# Patient Record
Sex: MALE | Race: WHITE
[De-identification: names, ages, dates, MRNs, and addresses within clinical notes are randomized per-mention and may not be internally consistent; named-entity substitution may affect disease eponyms.]

---

## 2019-08-29 ENCOUNTER — HOSPITAL ENCOUNTER (EMERGENCY)
Dept: HOSPITAL 92 - ERS | Age: 59
LOS: 1 days | Discharge: HOME | End: 2019-08-30
Payer: MEDICARE

## 2019-08-29 DIAGNOSIS — R10.11: Primary | ICD-10-CM

## 2019-08-29 DIAGNOSIS — I50.9: ICD-10-CM

## 2019-08-29 DIAGNOSIS — Z79.01: ICD-10-CM

## 2019-08-29 DIAGNOSIS — E11.9: ICD-10-CM

## 2019-08-29 DIAGNOSIS — Z79.899: ICD-10-CM

## 2019-08-29 DIAGNOSIS — Z79.4: ICD-10-CM

## 2019-08-29 DIAGNOSIS — I25.2: ICD-10-CM

## 2019-08-29 PROCEDURE — 96376 TX/PRO/DX INJ SAME DRUG ADON: CPT

## 2019-08-29 PROCEDURE — 36415 COLL VENOUS BLD VENIPUNCTURE: CPT

## 2019-08-29 PROCEDURE — 96374 THER/PROPH/DIAG INJ IV PUSH: CPT

## 2019-08-29 PROCEDURE — 71275 CT ANGIOGRAPHY CHEST: CPT

## 2019-08-29 PROCEDURE — 83690 ASSAY OF LIPASE: CPT

## 2019-08-29 PROCEDURE — 96375 TX/PRO/DX INJ NEW DRUG ADDON: CPT

## 2019-08-29 PROCEDURE — 81003 URINALYSIS AUTO W/O SCOPE: CPT

## 2019-08-29 PROCEDURE — 76705 ECHO EXAM OF ABDOMEN: CPT

## 2019-08-29 PROCEDURE — 85025 COMPLETE CBC W/AUTO DIFF WBC: CPT

## 2019-08-29 PROCEDURE — 80053 COMPREHEN METABOLIC PANEL: CPT

## 2019-08-29 PROCEDURE — 96361 HYDRATE IV INFUSION ADD-ON: CPT

## 2019-08-30 LAB
ALBUMIN SERPL BCG-MCNC: 4.1 G/DL (ref 3.5–5)
ALP SERPL-CCNC: 56 U/L (ref 40–150)
ALT SERPL W P-5'-P-CCNC: 15 U/L (ref 8–55)
ANION GAP SERPL CALC-SCNC: 16 MMOL/L (ref 10–20)
AST SERPL-CCNC: 12 U/L (ref 5–34)
BASOPHILS # BLD AUTO: 0 THOU/UL (ref 0–0.2)
BASOPHILS NFR BLD AUTO: 0.5 % (ref 0–1)
BILIRUB SERPL-MCNC: 0.3 MG/DL (ref 0.2–1.2)
BUN SERPL-MCNC: 11 MG/DL (ref 8.4–25.7)
CALCIUM SERPL-MCNC: 9.7 MG/DL (ref 7.8–10.44)
CHLORIDE SERPL-SCNC: 104 MMOL/L (ref 98–107)
CO2 SERPL-SCNC: 26 MMOL/L (ref 22–29)
CREAT CL PREDICTED SERPL C-G-VRATE: 0 ML/MIN (ref 70–130)
EOSINOPHIL # BLD AUTO: 0.3 THOU/UL (ref 0–0.7)
EOSINOPHIL NFR BLD AUTO: 2.9 % (ref 0–10)
GLOBULIN SER CALC-MCNC: 2.9 G/DL (ref 2.4–3.5)
GLUCOSE SERPL-MCNC: 174 MG/DL (ref 70–105)
HGB BLD-MCNC: 14.2 G/DL (ref 14–18)
LIPASE SERPL-CCNC: 44 U/L (ref 8–78)
LYMPHOCYTES # BLD: 3.3 THOU/UL (ref 1.2–3.4)
LYMPHOCYTES NFR BLD AUTO: 33.1 % (ref 21–51)
MCH RBC QN AUTO: 30.1 PG (ref 27–31)
MCV RBC AUTO: 87.3 FL (ref 78–98)
MONOCYTES # BLD AUTO: 0.7 THOU/UL (ref 0.11–0.59)
MONOCYTES NFR BLD AUTO: 6.8 % (ref 0–10)
NEUTROPHILS # BLD AUTO: 5.7 THOU/UL (ref 1.4–6.5)
NEUTROPHILS NFR BLD AUTO: 56.7 % (ref 42–75)
PLATELET # BLD AUTO: 300 THOU/UL (ref 130–400)
POTASSIUM SERPL-SCNC: 4.8 MMOL/L (ref 3.5–5.1)
PROT UR STRIP.AUTO-MCNC: 20 MG/DL
RBC # BLD AUTO: 4.72 MILL/UL (ref 4.7–6.1)
SODIUM SERPL-SCNC: 141 MMOL/L (ref 136–145)
WBC # BLD AUTO: 10.1 THOU/UL (ref 4.8–10.8)

## 2019-08-30 NOTE — CT
PRELIMINARY REPORT/[facility]/EMERGENCY AFTER

HOURS PROCEDURE: 

 

 

EXAM:

CT Angiography Chest With Contrast

 

EXAM DATE/TIME:

8/30/2019 3:17 AM

 

CLINICAL HISTORY:

59 years old, male; Chest pain; Abdominal pain; Acute; Patient HX: No previous. . . Er 13. . . M59 C/
O ruq pain that began this evening, also states it radiates to the back. Reports it is associated w/n
/v. Denies fevers, sweats, chills.

 

TECHNIQUE:

Imaging protocol: Computed tomographic angiography of the chest with intravenous contrast. 3D renderi
ng: MIP reconstructed images were created and reviewed.

 

COMPARISON:

No relevant prior studies available.

 

FINDINGS:

Tubes, catheters and devices: Left sided automated implantable cardioverter defibrillator device is i
ntact and in satisfactory position.

Pulmonary arteries: Bolus is timed for the aorta. No central embolism seen.

Aorta: There are atheromatous calcifications of the aorta without visible aneurysm. No dissection.

Lungs: No focal infiltrate. No masses.

Pleural space: No pneumothorax. No pleural effusion.

Heart: Mild cardiomegaly. There are atheromatous calcifications of the coronary vasculature.

Lymph nodes: Unremarkable. No enlarged lymph nodes.

Bones/joints: There are median sternotomy changes. Chronic degenerative spinal changes without acute 
fracture or dislocation.

Soft tissues: Unremarkable.

 

IMPRESSION:

Cardiomegaly.

Atheromatous disease. No aneurysm or dissection.

 

=========================

 

EXAM:

CT Angiography Abdomen With Contrast

 

EXAM DATE/TIME:

8/30/2019 3:17 AM

 

CLINICAL HISTORY:

59 years old, male; Chest pain; Abdominal pain; Acute; Patient HX: No previous. . . Er 13. . . M59 C/
O ruq pain that began this evening, also states it radiates to the back. Reports it is associated w/n
/v. Denies fevers, sweats, chills.

 

TECHNIQUE:

Imaging protocol: Computed tomographic angiography images of the abdomen with intravenous contrast ma
terial. 3D rendering: MIP reconstructed images were created and reviewed.

 

COMPARISON:

No relevant prior studies available.

 

FINDINGS:

VASCULATURE:

Aorta: There are atheromatous changes of the abdominal aorta without aneurysm.

Celiac trunk and mesenteric arteries: No occlusion or significant stenosis.

Renal arteries: No occlusion or significant stenosis.

ABDOMEN:

Liver: Liver is enlarged at 22.5 cm and demonstrates diffuse fatty infiltration. No solid mass is williams
ntified.

Gallbladder and bile ducts: Gallbladder distended. No calcified stones. No ductal dilation.

Pancreas: No ductal dilation.

Spleen: No splenomegaly.

Adrenals: No mass.

Kidneys and ureters: Bilateral simple renal cysts, largest on the right measuring 3.5 cm vs 2.6 cm on
 the left. Six mm nonobstructing stone, left lower pole. Three mm nonobstructing stone, left lower po
le.

Stomach and bowel: There is moderate colonic fecal retention.

Intraperitoneal space: Unremarkable. No free air. No significant fluid collection.

Bones/joints: Chronic degenerative spinal changes without acute fracture or dislocation.

Soft tissues: Unremarkable.

Lymph nodes: Unremarkable. No enlarged lymph nodes.

 

IMPRESSION:

Atheromatous disease.

No aneurysm or dissection.

Distended gallbladder.

Nonobstructing renal stones.

Fecal retention.

 

Thank you for allowing us to participate in the care of your patient.

Dictated and Authenticated by: Chantale Harkins MD

08/30/2019 3:41 AM Central Time ([place] & [place])

 

 

 

FINAL REPORT 

 

CT ANGIOGRAM OF THORACIC AORTA

CT ANGIOGRAM OF ABDOMINAL AORTA:

 

Date:  08/30/19 

 

HISTORY:  

Right upper quadrant pain, radiating to the back. Evaluate for dissection.

 

COMPARISON:  

None. 

 

TECHNIQUE:  

CT angiogram of the thoracic and abdominal aorta performed in the axial plane. Three-dimensional refo
rmatted images are submitted for interpretation. 

 

FINDINGS:

 

CHEST CT:

Cardiomegaly. No significant mediastinal mass, lymphadenopathy, or hematoma. There are coronary arter
y calcifications. 

 

Trachea and central bronchi are patent. No masses or consolidation. No pneumothorax or osseous abnorm
alities. 

 

ABDOMEN CT:

Visualized solid organs demonstrate appropriate enhancement. Hypodensities in the left and right tamy
l cortex are compatible with cysts. Bilateral nonobstructing intrarenal calculi are noted. Visualized
 alimentary canal is unremarkable. 

 

CT ANGIOGRAM:

There is appropriate enhancement and luminal diameter of the root of the thoracic aorta, ascending th
oracic aorta, aortic arch, descending thoracic aorta, and abdominal aorta. The celiac artery origin, 
superior mesenteric artery origin, renal artery origin, inferior mesenteric artery origin, aortic bif
urcation, and visualized iliac arteries have appropriate enhancement and luminal diameter. No aneurys
m or dissection. Mild atherosclerosis of the distal abdominal aorta. The visualized origin of the gre
at vessels and neck are also unremarkable. 

 

IMPRESSION: 

No evidence of aneurysm or dissection.

 

This report is in agreement with the preliminary report by Gregg. 

 

 

POS: [facility] Referring Physician (Optional): Andreas

## 2019-08-30 NOTE — ULT
PRELIMINARY REPORT/[facility]/EMERGENCY AFTER HOURS PROCEDURE:

 

Addendum created by Chantale Harkins MD on 8/30/2019 1:40 AM Central Time ([place] & [place])

Liver is enlarged at 21 cm and demonstrates diffuse fatty infiltration. No solid mass is identified.

Minimal gallbladder sludge.

Initial Report created on 8/30/2019 1:39 AM Central Time ([place] & [place])

 

EXAM:

US Abdomen Limited, Right Upper Quadrant

 

EXAM DATE/TIME:

8/30/2019 12:56 AM

 

CLINICAL HISTORY:

59 years old, male; Abdominal pain; Localized; Right upper quadrant (ruq); Patient HX: Ruq pain, bloa
ting, n/v

 

TECHNIQUE:

Imaging protocol: Real-time ultrasound of the abdomen with image documentation. Examination was focus
ed on the right upper quadrant.

 

COMPARISON:

No relevant prior studies available.

 

FINDINGS:

Liver: No masses.

Gallbladder: Distended. No gallstones. There is no gallbladder wall thickening.

Common bile duct: 5 mm. No stones. No dilation.

Pancreas: Obscured by bowel gas.

Right kidney: 3.4 x 3.1 x 3.1 cm simple cyst. No solid mass. No hydronephrosis.

 

IMPRESSION:

Distended gallbladder which otherwise appears normal. No focal tenderness.

No biliary stone or dilation. Right renal cyst.

 

Thank you for allowing us to participate in the care of your patient.

Dictated and Authenticated by: Chantale Harkins MD

08/30/2019 1:39 AM Central Time ([place] & [place])

 

 

 

FINAL REPORT 

 

GALLBLADDER ULTRASOUND:

 

FINDINGS/IMPRESSION: 

Final report is in agreement with the above-provided preliminary interpretation.

 

No acute gallbladder pathology.

 

Incidental right renal cyst.

 

Increased echogenicity of hepatic parenchyma which can be seen in the setting of hepatic steatosis.  
Correlate liver function enzymes.

## 2019-09-02 ENCOUNTER — HOSPITAL ENCOUNTER (INPATIENT)
Dept: HOSPITAL 92 - ERS | Age: 59
LOS: 5 days | Discharge: HOME | DRG: 418 | End: 2019-09-07
Attending: FAMILY MEDICINE | Admitting: FAMILY MEDICINE
Payer: MEDICARE

## 2019-09-02 VITALS — BODY MASS INDEX: 33.7 KG/M2

## 2019-09-02 DIAGNOSIS — I25.2: ICD-10-CM

## 2019-09-02 DIAGNOSIS — D72.829: ICD-10-CM

## 2019-09-02 DIAGNOSIS — K82.A1: ICD-10-CM

## 2019-09-02 DIAGNOSIS — Z95.1: ICD-10-CM

## 2019-09-02 DIAGNOSIS — I50.22: ICD-10-CM

## 2019-09-02 DIAGNOSIS — K81.0: Primary | ICD-10-CM

## 2019-09-02 DIAGNOSIS — I11.0: ICD-10-CM

## 2019-09-02 DIAGNOSIS — Z95.810: ICD-10-CM

## 2019-09-02 DIAGNOSIS — Z79.4: ICD-10-CM

## 2019-09-02 DIAGNOSIS — E83.42: ICD-10-CM

## 2019-09-02 DIAGNOSIS — E11.9: ICD-10-CM

## 2019-09-02 DIAGNOSIS — E83.39: ICD-10-CM

## 2019-09-02 DIAGNOSIS — Z79.899: ICD-10-CM

## 2019-09-02 DIAGNOSIS — Z79.02: ICD-10-CM

## 2019-09-02 DIAGNOSIS — I48.0: ICD-10-CM

## 2019-09-02 DIAGNOSIS — R77.8: ICD-10-CM

## 2019-09-02 DIAGNOSIS — I25.10: ICD-10-CM

## 2019-09-02 DIAGNOSIS — F17.210: ICD-10-CM

## 2019-09-02 DIAGNOSIS — E87.6: ICD-10-CM

## 2019-09-02 DIAGNOSIS — M06.9: ICD-10-CM

## 2019-09-02 DIAGNOSIS — D69.1: ICD-10-CM

## 2019-09-02 DIAGNOSIS — Z88.0: ICD-10-CM

## 2019-09-02 DIAGNOSIS — Z79.84: ICD-10-CM

## 2019-09-02 DIAGNOSIS — I48.91: ICD-10-CM

## 2019-09-02 DIAGNOSIS — R79.89: ICD-10-CM

## 2019-09-02 LAB
ALBUMIN SERPL BCG-MCNC: 3.7 G/DL (ref 3.5–5)
ALP SERPL-CCNC: 73 U/L (ref 40–150)
ALT SERPL W P-5'-P-CCNC: 14 U/L (ref 8–55)
ANION GAP SERPL CALC-SCNC: 15 MMOL/L (ref 10–20)
AST SERPL-CCNC: 11 U/L (ref 5–34)
BASOPHILS # BLD AUTO: 0 THOU/UL (ref 0–0.2)
BASOPHILS NFR BLD AUTO: 0.2 % (ref 0–1)
BILIRUB SERPL-MCNC: 0.4 MG/DL (ref 0.2–1.2)
BUN SERPL-MCNC: 10 MG/DL (ref 8.4–25.7)
CALCIUM SERPL-MCNC: 9.4 MG/DL (ref 7.8–10.44)
CHLORIDE SERPL-SCNC: 98 MMOL/L (ref 98–107)
CK MB SERPL-MCNC: 1.4 NG/ML (ref 0–6.6)
CO2 SERPL-SCNC: 29 MMOL/L (ref 22–29)
CREAT CL PREDICTED SERPL C-G-VRATE: 0 ML/MIN (ref 70–130)
EOSINOPHIL # BLD AUTO: 0.1 THOU/UL (ref 0–0.7)
EOSINOPHIL NFR BLD AUTO: 0.8 % (ref 0–10)
GLOBULIN SER CALC-MCNC: 3.2 G/DL (ref 2.4–3.5)
GLUCOSE SERPL-MCNC: 135 MG/DL (ref 70–105)
HGB BLD-MCNC: 12.8 G/DL (ref 14–18)
LIPASE SERPL-CCNC: 20 U/L (ref 8–78)
LYMPHOCYTES # BLD: 2 THOU/UL (ref 1.2–3.4)
LYMPHOCYTES NFR BLD AUTO: 14 % (ref 21–51)
MCH RBC QN AUTO: 29.6 PG (ref 27–31)
MCV RBC AUTO: 88.1 FL (ref 78–98)
MONOCYTES # BLD AUTO: 1.4 THOU/UL (ref 0.11–0.59)
MONOCYTES NFR BLD AUTO: 9.7 % (ref 0–10)
NEUTROPHILS # BLD AUTO: 10.5 THOU/UL (ref 1.4–6.5)
NEUTROPHILS NFR BLD AUTO: 75.3 % (ref 42–75)
PLATELET # BLD AUTO: 275 THOU/UL (ref 130–400)
POTASSIUM SERPL-SCNC: 4.5 MMOL/L (ref 3.5–5.1)
PROT UR STRIP.AUTO-MCNC: 10 MG/DL
RBC # BLD AUTO: 4.34 MILL/UL (ref 4.7–6.1)
SODIUM SERPL-SCNC: 137 MMOL/L (ref 136–145)
TROPONIN I SERPL DL<=0.01 NG/ML-MCNC: 0.07 NG/ML (ref ?–0.03)
WBC # BLD AUTO: 13.9 THOU/UL (ref 4.8–10.8)

## 2019-09-02 PROCEDURE — 80076 HEPATIC FUNCTION PANEL: CPT

## 2019-09-02 PROCEDURE — 96375 TX/PRO/DX INJ NEW DRUG ADDON: CPT

## 2019-09-02 PROCEDURE — 88304 TISSUE EXAM BY PATHOLOGIST: CPT

## 2019-09-02 PROCEDURE — 82248 BILIRUBIN DIRECT: CPT

## 2019-09-02 PROCEDURE — 86900 BLOOD TYPING SEROLOGIC ABO: CPT

## 2019-09-02 PROCEDURE — 96374 THER/PROPH/DIAG INJ IV PUSH: CPT

## 2019-09-02 PROCEDURE — 36430 TRANSFUSION BLD/BLD COMPNT: CPT

## 2019-09-02 PROCEDURE — P9035 PLATELET PHERES LEUKOREDUCED: HCPCS

## 2019-09-02 PROCEDURE — 86850 RBC ANTIBODY SCREEN: CPT

## 2019-09-02 PROCEDURE — 84484 ASSAY OF TROPONIN QUANT: CPT

## 2019-09-02 PROCEDURE — 93005 ELECTROCARDIOGRAM TRACING: CPT

## 2019-09-02 PROCEDURE — 80053 COMPREHEN METABOLIC PANEL: CPT

## 2019-09-02 PROCEDURE — 71275 CT ANGIOGRAPHY CHEST: CPT

## 2019-09-02 PROCEDURE — 83735 ASSAY OF MAGNESIUM: CPT

## 2019-09-02 PROCEDURE — 80074 ACUTE HEPATITIS PANEL: CPT

## 2019-09-02 PROCEDURE — 85027 COMPLETE CBC AUTOMATED: CPT

## 2019-09-02 PROCEDURE — 85730 THROMBOPLASTIN TIME PARTIAL: CPT

## 2019-09-02 PROCEDURE — 81003 URINALYSIS AUTO W/O SCOPE: CPT

## 2019-09-02 PROCEDURE — 86901 BLOOD TYPING SEROLOGIC RH(D): CPT

## 2019-09-02 PROCEDURE — 96361 HYDRATE IV INFUSION ADD-ON: CPT

## 2019-09-02 PROCEDURE — 85610 PROTHROMBIN TIME: CPT

## 2019-09-02 PROCEDURE — A9537 TC99M MEBROFENIN: HCPCS

## 2019-09-02 PROCEDURE — 83605 ASSAY OF LACTIC ACID: CPT

## 2019-09-02 PROCEDURE — 80048 BASIC METABOLIC PNL TOTAL CA: CPT

## 2019-09-02 PROCEDURE — 36416 COLLJ CAPILLARY BLOOD SPEC: CPT

## 2019-09-02 PROCEDURE — 83690 ASSAY OF LIPASE: CPT

## 2019-09-02 PROCEDURE — 78226 HEPATOBILIARY SYSTEM IMAGING: CPT

## 2019-09-02 PROCEDURE — 93306 TTE W/DOPPLER COMPLETE: CPT

## 2019-09-02 PROCEDURE — 36415 COLL VENOUS BLD VENIPUNCTURE: CPT

## 2019-09-02 PROCEDURE — 85007 BL SMEAR W/DIFF WBC COUNT: CPT

## 2019-09-02 PROCEDURE — 82553 CREATINE MB FRACTION: CPT

## 2019-09-02 PROCEDURE — 85025 COMPLETE CBC W/AUTO DIFF WBC: CPT

## 2019-09-02 PROCEDURE — 96376 TX/PRO/DX INJ SAME DRUG ADON: CPT

## 2019-09-02 PROCEDURE — 84100 ASSAY OF PHOSPHORUS: CPT

## 2019-09-02 PROCEDURE — 76705 ECHO EXAM OF ABDOMEN: CPT

## 2019-09-02 PROCEDURE — 96365 THER/PROPH/DIAG IV INF INIT: CPT

## 2019-09-02 NOTE — ULT
GALLBLADDER ULTRASOUND:

 

History: Right upper quadrant pain. 

 

FINDINGS: 

The liver demonstrates increased echogenicity concerning for fatty infiltration without focal mass or
 intrahepatic biliary dilatation. No gallstones are seen. There is thickening and edema in the wall o
f the gallbladder measuring about 4 mm. The common duct measures 5 mm. 

 

The pancreas is not well visualized due to overlying bowel gas. The right kidney is unremarkable. No 
free fluid is seen in Sarkar's pouch. 

 

IMPRESSION: 

Edematous gallbladder wall without gallstones. If there is concern for acute calculus, cholecystitis 
with evaluation with HIDA scan should be performed. 

 

POS: [facility]

## 2019-09-02 NOTE — PDOC.FPRHP
- History of Present Illness


Chief Complaint: Abdominal pain


History of Present Illness: 


58yo male w/ significant pmhx of CAD w/ CABG, DMII, afib, and CHF presents to 

the ED complaining of RUQ pain. Pt states that the pain started at the end of 

last week and has been progressively worsening. Pt noticed that the pain is 

worse after meals so he wasn't eating much the last couple days which improved 

his pain until today when it acutely worsened after eating greasy gas station 

food. Pt denies any assocaited sx including CP, N/V, diarrhea, and 

constipation. Initial workup in the ED showed the pt to have thickening and 

enlargement of his gallbladder w/ a WBC of 13.9 and indeterminate level 

troponin. Pt denies any chest pain or shortness of breath.





- Allergies/Adverse Reactions


 Allergies











Allergy/AdvReac Type Severity Reaction Status Date / Time


 


Penicillins Allergy Intermediate Rash Verified 10/26/16 13:02














- Home Medications


 











 Medication  Instructions  Recorded  Confirmed  Type


 


Atorvastatin Calcium [Lipitor] 40 mg PO QPM 10/26/16 09/02/19 History


 


Carvedilol 1 tab PO BID 10/26/16 09/02/19 History


 


Clopidogrel Bisulfate [Clopidogrel] 1 tab PO DAILY 10/26/16 09/02/19 History


 


Digoxin 0.5 tab PO DAILY 10/26/16 09/02/19 History


 


Insulin Glargine,Hum.Rec.Anlog 64 unit SC QPM 10/26/16 09/02/19 History





[Lantus Solostar]    


 


Liraglutide [Victoza 3-Ishmael] 1.8 mg SC DAILY 10/26/16 09/02/19 History


 


Sotalol HCl [Betapace] 40 mg PO BID 10/26/16 09/02/19 History


 


metFORMIN HCl 1 tab PO BID 10/26/16 09/02/19 History


 


Icosapent Ethyl [Vascepa] 2 cap PO BID 09/02/19 09/02/19 History


 


Ivabradine [Corlanor] 0.5 tab PO BID 09/02/19 09/02/19 History


 


Oxycodone Myristate [Xtampza ER] 1 cap PO Q12H 09/02/19 09/02/19 History


 


Sacubitril/Valsartan 49/51 1 tab PO BID 09/02/19 09/02/19 History





[Entresto 49 mg-51 mg Tablet]    


 


glipiZIDE [Glipizide] 1 tab PO BID 09/02/19 09/02/19 History














- History


PMHx: CAD s/p MI/5vCABG/Stents, Combined systolic and diastolic CHF with AICD 

in place, Spinal Stenosis, Rheumatoid Arthritis, paroxysmal a-fib, DM2


 


PSHx: 5v CABG, AICD placement





FHx: HTN, DM, CAD


 


Social: Denies tobacco, alcohol, or drugs


 


PCP: Dr. Granados - [facility]





- Review of Systems


General: denies: fever/chills, weight/appetite/sleep changes


Eyes: denies: eye pain, vision changes


ENT: denies: nasal congestion, rhinorrhea


Respiratory: denies: cough, shortness of breath


Cardiovascular: denies: chest pain, palpitation, edema


Gastrointestinal: reports: abdominal pain.  denies: nausea, vomiting, diarrhea, 

constipation


Genitourinary: denies: dysuria, polyuria


Skin: denies: rashes, lesions


Musculoskeletal: denies: pain, tenderness


Neurological: denies: numbness, weakness


Psychological: denies: anxiety, depression





- Vital signs


BP: 128/78, Pulse: 79, Resp: 16, Temp: 98.8 (Oral), Pain: 6, O2 sat: 98 on Room 

Air, Wt: 103.87kg 








- Physical Exam


Constitutional: NAD, awake, alert and oriented, well developed


HEENT: normocephalic and atraumatic, PERRLA, EOMI, conjunctiva clear, grossly 

normal vision, grossly normal hearing


Neck: supple, no LAD


Heart: RRR, normal S1/S2, no murmurs/rubs/gallops, pulses present, no edema


Lungs: CTAB, no respiratory distress, good air movement, no rales/rhonchi, no 

wheezing


Abdomen: soft, bowel sounds present, no masses/distention, other (TTP in RUQ 

with + murphys sign, no rebound or guarding)


Musculoskeletal: normal structure, normal tone


Neurological: no focal deficit, CN II-XII intact


Skin: no rash/lesions, good turgor, capillary refill <2 seconds


Heme/Lymphatic: no unusual bruising or bleeding, no purpura


Psychiatric: normal mood and affect, good judgment and insight, intact recent 

and remote memory





FMR H&P: Results





- Labs


Result Diagrams: 


 09/03/19 02:52





 09/03/19 02:52


Lab results: 


 











WBC  13.9 thou/uL (4.8-10.8)  H  09/02/19  17:14    


 


Hgb  12.8 g/dL (14.0-18.0)  L  09/02/19  17:14    


 


Hct  38.3 % (42.0-52.0)  L  09/02/19  17:14    


 


MCV  88.1 fL (78.0-98.0)   09/02/19  17:14    


 


Plt Count  275 thou/uL (130-400)   09/02/19  17:14    


 


Neutrophils %  75.3 % (42.0-75.0)  H  09/02/19  17:14    


 


Sodium  137 mmol/L (136-145)   09/02/19  17:14    


 


Potassium  4.5 mmol/L (3.5-5.1)   09/02/19  17:14    


 


Chloride  98 mmol/L ()   09/02/19  17:14    


 


Carbon Dioxide  29 mmol/L (22-29)   09/02/19  17:14    


 


BUN  10 mg/dL (8.4-25.7)   09/02/19  17:14    


 


Creatinine  0.88 mg/dL (0.7-1.3)   09/02/19  17:14    


 


Glucose  135 mg/dL ()  H  09/02/19  17:14    


 


Calcium  9.4 mg/dL (7.8-10.44)   09/02/19  17:14    


 


Total Bilirubin  0.4 mg/dL (0.2-1.2)   09/02/19  17:14    


 


AST  11 U/L (5-34)   09/02/19  17:14    


 


ALT  14 U/L (8-55)   09/02/19  17:14    


 


Alkaline Phosphatase  73 U/L ()   09/02/19  17:14    


 


CK-MB (CK-2)  1.4 ng/mL (0-6.6)   09/02/19  17:14    


 


Serum Total Protein  6.9 g/dL (6.0-8.3)   09/02/19  17:14    


 


Albumin  3.7 g/dL (3.5-5.0)   09/02/19  17:14    


 


Lipase  20 U/L (8-78)   09/02/19  17:14    


 


Urine Ketones  Negative mg/dL (Negative)   09/02/19  17:40    


 


Urine Blood  Negative  (Negative)   09/02/19  17:40    


 


Urine Nitrite  Negative  (Negative)   09/02/19  17:40    


 


Ur Leukocyte Esterase  Negative Mary/uL (Negative)   09/02/19  17:40    














- EKG Interpretation


EKG: 


NSR with PVC's, rate 76





- Radiology Interpretation


  ** Other


Status: image reviewed by me, report reviewed by me


Additional comment: 


RUQ US: Gallbladder wall thickening to 4mm, no stones seen, CBD 5mm





FMR H&P: A/P





- Problem List


(1) Acute acalculous cholecystitis


Current Visit: Yes   Status: Suspected   Code(s): K81.0 - ACUTE CHOLECYSTITIS   





(2) Coronary artery disease


Current Visit: Yes   Status: Acute   Code(s): I25.10 - ATHSCL HEART DISEASE OF 

NATIVE CORONARY ARTERY W/O ANG PCTRS   





(3) Diabetes mellitus type 2 with complications


Current Visit: Yes   Status: Acute   Code(s): E11.8 - TYPE 2 DIABETES MELLITUS 

WITH UNSPECIFIED COMPLICATIONS   





(4) Atrial fibrillation


Current Visit: Yes   Status: Acute   Code(s): I48.91 - UNSPECIFIED ATRIAL 

FIBRILLATION   





(5) Leukocytosis


Current Visit: Yes   Status: Acute   Code(s): D72.829 - ELEVATED WHITE BLOOD 

CELL COUNT, UNSPECIFIED   





- Plan


Acute Acalculous Cholecystitis - Suspected


RUQ pain, worse post prandial, FHx of cholecystitis


US: gallbladder wall thickening, WBC 13.9


- NPO


- Trend WBC


- IVF


- ED started on Zosyn, will continue


- Surgery consulted, Dr. Bryant, recommend HIDA scan





Elevated troponin w/ Hx of CAD s/p CABG


Pt with h/o 5v CABG, currently asymptomatic


- Will trend 


- Repeat EKG and trops for any chest pain


- Will call Dr. Banks in the AM for cardiac clearance


- Resume home cardiac rx





Diabetes Mellitus II


- Hold Rx while NPO


- Accuchecks q6h


- SSI





Paroxysmal A-fib


- Currently taking several anti-arrhythmics, will continue


- Admit to tele





CHF


- Daily weights


- Strict I/O's


- Continue home meds


- IVF @ 100 while pt is borderline hypotensive


   - Pt states he normally drinks about 3 gallons of water per day and takes 

his furosemide prn which is usually every few days


   - Monitor fluid status closely





Admit: Tele inpt


Diet: NPO





Code status: Full





FMR H&P: Upper Level





- Pertinent history


60 y/o M PMHx CAD s/p 5v CABG, DM2, a-fib, CHF presents to the ED complaining 

of RUQ abd pain. He reports the pain has been there since Thursday and starts 

just after eating. Nothing else makes it worse. He has not been eating as much 

the past few days and that has helped the pain. Denies any vomiting or 

diarrhea. He denies fevers, chills. Denies any prior abdominal problems or 

surgeries. Denies chest pain, SOB, diaphoresis. 





- Pertinent findings


Vitals: BP: 128/78, Pulse: 79, Resp: 16, Temp: 98.8 (Oral), Pain: 6, O2 sat: 98 

on Room Air, Wt: 103.87kg





PE: Gen - alert, oriented, NAD


HEENT - MMM


CV - RRR, no murmurs


Lungs - CTAB, no wheezes


Abd - non-distended, normoactive bowel sounds, soft, TTP in RUQ with + [place] 

sign, no rebound or guarding





RUQ US: Gallbladder wall thickening, no stones





- Plan


Date/Time: 09/02/19 2021








I, Pia Saunders MD, PGY-3, have evaluated this patient and agree with findings/

plan as outlined by intern resident. Pertinent changes/additions are listed 

here.








1. Suspected Acute Acalculous Cholecystitis


WBC count WNL. Afebrile. s/p zosyn in ED


-Dr. Bryant with general surgery has been consulted, appreciate recs


-NPO


-HIDA scan


-Will hold further fluids at this time as pt not septic, appears fluid neutral 

and do not want to overload


-Zosyn





2. Elevated troponin


Pt with h/o 5v CABG


-Will trend 


-Repeat EKG and trops for any chest pain, currently asymptomatic


-Will call Dr. Banks in the AM for cardiac clearance and prior records as pt 

reports stress test within past 3 months





3. CAD s/p CABG and stents


-Contact cards as above


-Continue medications, but hold aspirin and plavix for surgery





4. DM2


-Will hold meds while NPO


-Accuchecks q6h


-SSI





5. Paroxysmal a-fib


-continue home meds





6. CHF


-Daily weights


-Strict I/O's


-Continue home meds





Dispo: Admit to tele


LOS: likely 2 days


Code status: Full





Addendum - Attending





- Attending Attestation


Date/Time: 09/02/19 6730





I personally evaluated the patient and discussed the management with Dr. Russo/

Chip. 


I agree with the History, Examination, Assessment and Plan documented above 

with any addition or exceptions noted below.








 Patient is 60 yo M with history of CAD s/p CABG, CHF unspecified type, Afib, 

DM here for abdominal pain. Reports several days of pain, onset and worsened by 

food intake. Denies fevers, chills, nausea, vomiting, diarrhea. Denies chest 

pain, shortness of breath, or worsening pain with exertion. Exam reveals RUQ 

abdominal TTP. U/S shows possible cholecystitis without gallstone formation. 

His LFTs are currently normal. Vitals otherwise stable. He will be admitted for 

concern for acalculous cholecystitis. Gen Surg has been consulted and the 

patient will undergo HIDA scan. Continue Zosyn. He was also noted to have 

indeterminate trops without chest pain, already downtrending, and likely 2/2 

troponin leak from his CHF and known cardiomyopathy. Monitor in telemetry. 

Further recs and mgmt per HIDA result and GenSurg mgmt.

## 2019-09-03 LAB
ANION GAP SERPL CALC-SCNC: 14 MMOL/L (ref 10–20)
BASOPHILS # BLD AUTO: 0 THOU/UL (ref 0–0.2)
BASOPHILS NFR BLD AUTO: 0.2 % (ref 0–1)
BUN SERPL-MCNC: 16 MG/DL (ref 8.4–25.7)
CALCIUM SERPL-MCNC: 8.7 MG/DL (ref 7.8–10.44)
CHLORIDE SERPL-SCNC: 101 MMOL/L (ref 98–107)
CO2 SERPL-SCNC: 24 MMOL/L (ref 22–29)
CREAT CL PREDICTED SERPL C-G-VRATE: 0 ML/MIN (ref 70–130)
EOSINOPHIL # BLD AUTO: 0 THOU/UL (ref 0–0.7)
EOSINOPHIL NFR BLD AUTO: 0.1 % (ref 0–10)
GLUCOSE SERPL-MCNC: 160 MG/DL (ref 70–105)
HGB BLD-MCNC: 11.8 G/DL (ref 14–18)
LYMPHOCYTES # BLD: 1 THOU/UL (ref 1.2–3.4)
LYMPHOCYTES NFR BLD AUTO: 8.9 % (ref 21–51)
MCH RBC QN AUTO: 30 PG (ref 27–31)
MCV RBC AUTO: 88.6 FL (ref 78–98)
MONOCYTES # BLD AUTO: 1 THOU/UL (ref 0.11–0.59)
MONOCYTES NFR BLD AUTO: 9.4 % (ref 0–10)
NEUTROPHILS # BLD AUTO: 8.7 THOU/UL (ref 1.4–6.5)
NEUTROPHILS NFR BLD AUTO: 81.4 % (ref 42–75)
PLATELET # BLD AUTO: 243 THOU/UL (ref 130–400)
POTASSIUM SERPL-SCNC: 3.8 MMOL/L (ref 3.5–5.1)
RBC # BLD AUTO: 3.93 MILL/UL (ref 4.7–6.1)
SODIUM SERPL-SCNC: 135 MMOL/L (ref 136–145)
WBC # BLD AUTO: 10.7 THOU/UL (ref 4.8–10.8)

## 2019-09-03 RX ADMIN — SACUBITRIL AND VALSARTAN SCH TAB: 49; 51 TABLET, FILM COATED ORAL at 22:53

## 2019-09-03 RX ADMIN — SACUBITRIL AND VALSARTAN SCH TAB: 49; 51 TABLET, FILM COATED ORAL at 11:17

## 2019-09-03 NOTE — NM
HEPATOBILIARY SCAN:

 

Date:  09/02/19 

 

HISTORY:  

Acute cholecystitis. 

 

RADIOPHARMACEUTICAL:  5 mCi technetium-99m mebrofenin injected intravenously. 

 

CCK-8:  The patient was pretreated with 2 mg IV CCK infused 30 minutes prior to the administration of
 the radiopharmaceutical. 

 

FINDINGS:

 

There is tracer extraction by the liver without excretion into the biliary tract or small bowel loops
, or filling of the gallbladder at 4 hours post injection. 

 

Since the recent gallbladder ultrasound demonstrates no abnormal biliary ductal dilatation, this find
ing is most likely due to hepatocellular dysfunction. 

 

As there is no biliary excretion, the possibility of cholecystitis cannot be excluded on this study. 


 

Discussed over the telephone with Dr. Bryant at 1330 hours. 

CODE CR. 

 

 

 

 

POS: [facility]

## 2019-09-03 NOTE — CON
DATE OF CONSULTATION:  09/02/2019



REQUESTING PHYSICIAN:  Leonard Clark MD



HISTORY OF PRESENT ILLNESS:  This is a 59-year-old  man, who presented to

emergency department with recurrent right upper quadrant abdominal pain. 



The patient first experienced such pain 4 days previously following the dinner. 



Pain was described as sharp, bandlike radiation to his back. 



The pain was rated at 9/10 associated with episodes of nausea and nonbilious emesis. 



The patient denies any abdominal bloating or frequent flatulence. 



The patient was evaluated in the emergency department at that time and workup

included abdominal ultrasound following which the patient was discharged home. 



The next day, he felt a little better and tried to eat a bowl of soup with immediate

return of the pain. 



He has been anorexic since that time. 



Pain returned with maximum intensity today and failed to resolve.  As a result, the

patient presented to emergency department. 



He now admits to some chills, though no fevers. 



He denies any diarrhea.



PAST MEDICAL HISTORY:  Significant for coronary artery disease, of which the patient

is status post myocardial infarction 21 years ago. 



He has had no further angina since that time. 



Other pertinent medical history includes chronic congestive heart failure, type 2

diabetes mellitus, paroxysmal atrial fibrillation, and rheumatoid arthritis. 



PAST SURGICAL HISTORY:  Significant for 5-vessel coronary artery bypass graft that

was in 1999. 



He had coronary arterial stent in 2010 and repeat coronary angiography with stenting

in 2015. 



He also admits to having had left inguinal herniorrhaphy, upper and lower

endoscopies. 



SOCIAL HISTORY:  He used to smoke up to a pack of cigarettes per day prior to his

MI.  He has not smoked since that time. 



He denies any ethanol or illicit drug abuse.



FAMILY HISTORY:  Noncontributory for this patient's age.



PRE-HOSPITAL MEDICATIONS:  Includes Plavix 75 mg p.o. daily, glipizide 10 mg p.o.

b.i.d., Lantus insulin 64 units subcutaneously q.p.m., metformin 1000 mg p.o.

b.i.d., oxycodone 18 mg p.o. q.12 hours, atorvastatin 40 mg p.o. q.p.m., carvedilol

25 mg p.o. b.i.d., digoxin 0.5 mg p.o. daily, Vascepa 1 g 2 capsules p.o. b.i.d.,

Entresto 49 mg/51 mg p.o. b.i.d., and Betapace 40 mg p.o. b.i.d. 



ALLERGIES:  PENICILLIN.



REVIEW OF SYSTEMS:  Ten-point review of systems essentially unremarkable except as

stated in past medical history and chief complaint. 



PHYSICAL EXAMINATION:

GENERAL:  This reveals a 59-year-old normally developed man, who is otherwise

coherent and interactive and appears stated age.  The patient is alert and oriented

x3, appears to be in no acute distress at time of my evaluation. 

VITAL SIGNS:  Currently include blood pressure 128/78, pulse is 79, respiratory rate

is 16, temperature 98.8 degrees Fahrenheit, and oxygen saturation 98% on room air. 

HEENT:  Reveals normocephalic and atraumatic.  The pupils are equal, round, and

reactive to light and accommodation.  He has no scleral icterus present. 

HEART:  Reveals irregular rate and irregular rhythm. 

LUNGS:  Clear to auscultation bilaterally.  Breathing, regular and nonlabored. 

ABDOMEN:  Soft, nontender, nondistended. Liver and spleen nonpalpable below costal

margin. 

EXTREMITIES:  Reveal 2+ radial and pedal pulses bilaterally. 

NEUROLOGIC:  Reveals no focal deficits present.



LABORATORY FINDINGS:  Today includes a CBC with 13,900 white blood cells, hemoglobin

and hematocrit at 12.8 and 38.3 respectively.  Platelet count is 375,000. 



Metabolic profile; sodium 137, potassium 4.5, chloride is 98, bicarb is 29, BUN 10,

creatinine 0.88, glucose 135, total bilirubin is 0.4, AST and ALT are normal at 11

and 14 respectively. 



Alkaline phosphatase is also normal at 73. 



Troponin I which was trended was initially 0.158 and 3 hours later is 0.074. 



Serum lipase is normal at 20. 



I have personally reviewed the abdominal ultrasound, which was obtained today, which

revealed a slightly distended gallbladder with thickened gallbladder wall at 4 mm

when compared to abdominal ultrasound, which was obtained 4 days previously. 



No gallstones noted, however, some biliary sludge was present. 



Common bile duct remains normal on both studies at 5 mm in diameter.



IMPRESSIONS:  

1. Probable acute acalculous cholecystitis.

2. History of coronary artery disease.

3. History of diabetes mellitus.

4. Qualitative platelet dysfunction.



RECOMMENDATIONS:  

1. Obtain HIDA scan to exclude any biliary obstruction and furthermore this will

enable us to quantitate the gallbladder function. 

2. If surgical indication is established, the patient will likely be off Plavix for

at least 48-72 hours following which, laparoscopic cholecystectomy will be

undertaken. 

3. In the interim, we will ask Cardiology to evaluate the patient to weigh the risks

and benefits of proposed laparoscopic cholecystectomy. 

4. If the patient's risk far outweighs the benefit of cholecystectomy, we will give

consideration to percutaneous cholecystostomy tube placement in that setting. 

Above findings and recommendations have been discussed with the patient who

indicates understanding of information given. 



I have answered his questions. 



Should surgical intervention be pursued, the patient faces perioperative risk for

bleeding, infection, injury to bile duct or surrounding structures. 



He also faces additional risk for perioperative myocardial infarction given his

significant coronary artery disease. 



Thank you again, Dr. Clark for allowing me the opportunity to participate in the

care of this patient. 







Job ID:  [id number]

## 2019-09-03 NOTE — PDOC.FM
- Subjective


Subjective: 


pt resting comfortably in bed, reports abdominal pain and nausea improved 





- Objective


Result Diagrams: 


 09/03/19 02:52





 09/03/19 02:52





Phys Exam





- Physical Examination


Constitutional: NAD


HEENT: moist MMs


Neck: no JVD


Respiratory: clear to auscultation bilateral


Cardiovascular: no significant murmur


ttp RUQ


Musculoskeletal: pulses present


Neurological: moves all 4 limbs


Psychiatric: normal affect


Skin: no rash





Dx/Plan


(1) Atrial fibrillation


Code(s): I48.91 - UNSPECIFIED ATRIAL FIBRILLATION   Status: Acute   





(2) Coronary artery disease


Code(s): I25.10 - ATHSCL HEART DISEASE OF NATIVE CORONARY ARTERY W/O ANG PCTRS 

  Status: Acute   





(3) Diabetes mellitus type 2 with complications


Code(s): E11.8 - TYPE 2 DIABETES MELLITUS WITH UNSPECIFIED COMPLICATIONS   

Status: Acute   





(4) Acute acalculous cholecystitis


Code(s): K81.0 - ACUTE CHOLECYSTITIS   Status: Suspected   





- Plan


Plan: 





Suspected Acute Acalculous Cholecystitis


WBC count WNL. Afebrile. s/p zosyn in ED


-Dr. Bryant with general surgery has been consulted, appreciate recs


-NPO, HIDA scan pending, continue zosyn 





Elevated troponin


Pt with h/o 5v CABG


- downtrending


-Repeat EKG and trops for any chest pain, currently asymptomatic





CAD s/p CABG and stents


-Continue medications, but hold aspirin and plavix for surgery





DM2


-Will hold meds while NPO


-Accuchecks q6h


-SSI





Paroxysmal a-fib


-continue home meds





CHF


-Daily weights


-Strict I/O's


-Continue home meds





Code status: Full





Dispo: continue to eval for surgical intervention





Addendum - Attending





- Attending Attestation


Date/Time: 09/03/19 2926





I personally evaluated the patient and discussed the management with Dr. Ghosh. 


I agree with the History, Examination, Assessment and Plan documented above 

with any addition or exceptions noted below.





Patient here for suspected acalculous cholecystitis in the setting of severe 

cardiac disease. HIDA scan pending. Will likely need cardiac clearance prior to 

any surgery being performed. Awaiting further GenSurg recs.

## 2019-09-04 LAB
ALBUMIN SERPL BCG-MCNC: 2.9 G/DL (ref 3.5–5)
ALP SERPL-CCNC: 370 U/L (ref 40–150)
ALT SERPL W P-5'-P-CCNC: 188 U/L (ref 8–55)
ANION GAP SERPL CALC-SCNC: 17 MMOL/L (ref 10–20)
ANION GAP SERPL CALC-SCNC: 19 MMOL/L (ref 10–20)
AST SERPL-CCNC: 120 U/L (ref 5–34)
BASOPHILS # BLD AUTO: 0 THOU/UL (ref 0–0.2)
BASOPHILS NFR BLD AUTO: 0.3 % (ref 0–1)
BILIRUB SERPL-MCNC: 3.7 MG/DL (ref 0.2–1.2)
BUN SERPL-MCNC: 15 MG/DL (ref 8.4–25.7)
BUN SERPL-MCNC: 17 MG/DL (ref 8.4–25.7)
CALCIUM SERPL-MCNC: 8.4 MG/DL (ref 7.8–10.44)
CALCIUM SERPL-MCNC: 8.7 MG/DL (ref 7.8–10.44)
CHLORIDE SERPL-SCNC: 101 MMOL/L (ref 98–107)
CHLORIDE SERPL-SCNC: 99 MMOL/L (ref 98–107)
CO2 SERPL-SCNC: 21 MMOL/L (ref 22–29)
CO2 SERPL-SCNC: 24 MMOL/L (ref 22–29)
CREAT CL PREDICTED SERPL C-G-VRATE: 129 ML/MIN (ref 70–130)
CREAT CL PREDICTED SERPL C-G-VRATE: 130 ML/MIN (ref 70–130)
EOSINOPHIL # BLD AUTO: 0.1 THOU/UL (ref 0–0.7)
EOSINOPHIL NFR BLD AUTO: 0.8 % (ref 0–10)
GLOBULIN SER CALC-MCNC: 2.7 G/DL (ref 2.4–3.5)
GLUCOSE SERPL-MCNC: 176 MG/DL (ref 70–105)
GLUCOSE SERPL-MCNC: 217 MG/DL (ref 70–105)
HBCM INDEX: 0.04 S/CO (ref 0–0.79)
HBSAG INDEX: 0.48 S/CO (ref 0–0.99)
HEP A IGM S/CO: 0.07 S/CO (ref 0–0.79)
HEP C INDEX: 0.14 S/CO (ref 0–0.79)
HGB BLD-MCNC: 11.3 G/DL (ref 14–18)
LYMPHOCYTES # BLD: 0.9 THOU/UL (ref 1.2–3.4)
LYMPHOCYTES NFR BLD AUTO: 9.1 % (ref 21–51)
MCH RBC QN AUTO: 29.9 PG (ref 27–31)
MCV RBC AUTO: 88.9 FL (ref 78–98)
MONOCYTES # BLD AUTO: 0.7 THOU/UL (ref 0.11–0.59)
MONOCYTES NFR BLD AUTO: 7.3 % (ref 0–10)
NEUTROPHILS # BLD AUTO: 8.3 THOU/UL (ref 1.4–6.5)
NEUTROPHILS NFR BLD AUTO: 82.6 % (ref 42–75)
PLATELET # BLD AUTO: 223 THOU/UL (ref 130–400)
POTASSIUM SERPL-SCNC: 3.5 MMOL/L (ref 3.5–5.1)
POTASSIUM SERPL-SCNC: 3.7 MMOL/L (ref 3.5–5.1)
RBC # BLD AUTO: 3.78 MILL/UL (ref 4.7–6.1)
SODIUM SERPL-SCNC: 136 MMOL/L (ref 136–145)
SODIUM SERPL-SCNC: 137 MMOL/L (ref 136–145)
WBC # BLD AUTO: 10 THOU/UL (ref 4.8–10.8)

## 2019-09-04 RX ADMIN — SACUBITRIL AND VALSARTAN SCH: 49; 51 TABLET, FILM COATED ORAL at 09:18

## 2019-09-04 RX ADMIN — SACUBITRIL AND VALSARTAN SCH: 49; 51 TABLET, FILM COATED ORAL at 23:02

## 2019-09-04 NOTE — PRG
DATE OF SERVICE:  09/04/2019



SUBJECTIVE:  Mr. Mcmullen is a 59-year-old man with a significant coronary artery

disease history.  The patient came to the hospital to evaluate right upper quadrant

abdominal pain, nausea and vomiting.  The patient was consulted with General Surgery

with suspected acalculous cholecystitis.  The patient was found to have suspected

cholecystitis on ultrasound.  HIDA scan otherwise failed to visualize gallbladder,

common bile duct, or small bowel activity.  Suspect hepatic dysfunction.  The

patient also has elevated LFT.  Cardiology work also recommend the patient is low to

medium risk of cholecystectomy, laparoscopy.  Earlier today, Dr. Bryant evaluated the

patient.  He said to consult Gastroenterology for evaluation and obtain hepatitis

profile and minimize any hepatotoxic agents.  The patient was seen and evaluated on

round this evening.  The patient is lying down in bed comfortably with no acute

distress.  He is afebrile.  Vital signs are stable.  He reports having 7 to 8 time

of loose diarrhea today.  The stools to be clear yellow in nature, a little bit of

mucus at first and minimal then.  There is no blood to be reported.  His abdominal

pain subsides, somehow again better after diarrhea, then no nausea or vomiting to be

reported.  The patient will be tested for hepatitis profile and C diff due to

current antibiotic usage in the hospital. 







Job ID:  [id number]

## 2019-09-04 NOTE — PDOC.FM
- Subjective


Subjective: 





pt resting comfortably in bed, denies abdominal pain or nausea 





- Objective


Vital Signs & Weight: 


 Vital Signs (12 hours)











  Temp Pulse Resp BP Pulse Ox


 


 09/04/19 04:00  97.6 F  64  18  107/53 L  96


 


 09/03/19 23:35  98.9 F  58 L  20  93/50 L  92 L


 


 09/03/19 21:47   68   


 


 09/03/19 20:00  97.9 F  68  16  103/54 L  96








 Weight











Weight                         110.087 kg














I&O: 


 











 09/02/19 09/03/19 09/04/19





 06:59 06:59 06:59


 


Intake Total   2710


 


Output Total   400


 


Balance   2310











Result Diagrams: 


 09/04/19 04:56





 09/04/19 04:56





Phys Exam





- Physical Examination


Constitutional: NAD


HEENT: moist MMs


Neck: no JVD


Gastrointestinal: soft, non-tender, no distention


Musculoskeletal: no edema


Neurological: moves all 4 limbs


Psychiatric: normal affect


Skin: no rash





Dx/Plan


(1) Atrial fibrillation


Code(s): I48.91 - UNSPECIFIED ATRIAL FIBRILLATION   Status: Acute   





(2) Coronary artery disease


Code(s): I25.10 - ATHSCL HEART DISEASE OF NATIVE CORONARY ARTERY W/O ANG PCTRS 

  Status: Acute   





(3) Diabetes mellitus type 2 with complications


Code(s): E11.8 - TYPE 2 DIABETES MELLITUS WITH UNSPECIFIED COMPLICATIONS   

Status: Acute   





(4) Acute acalculous cholecystitis


Code(s): K81.0 - ACUTE CHOLECYSTITIS   Status: Suspected   





- Plan


Plan: 





Suspected Acute Acalculous Cholecystitis


WBC count WNL. Afebrile. s/p zosyn in ED


-Dr. Bryant with general surgery has been consulted, appreciate recs


-US showed thickened GB wall, HIDA abdnormal, continue zosyn 


- ALT/AST uptrending 





Elevated troponin, downtrended


Pt with h/o 5v CABG


-Repeat EKG and trops for any chest pain, currently asymptomatic





CAD s/p CABG and stents


-Continue medications, but hold aspirin and plavix for surgery


- consult cards, appreciate recs





DM2


-continue home meds


-Accuchecks q6h


-SSI





Paroxysmal a-fib


-continue home meds





CHF


-Daily weights


-Strict I/O's


-Continue home meds





Code status: Full





Dispo: continue to eval for surgical intervention





Addendum - Attending





- Attending Attestation


Date/Time: 09/04/19 0902





I personally evaluated the patient and discussed the management with Dr. Ghosh. 


I agree with the History, Examination, Assessment and Plan documented above 

with any addition or exceptions noted below.





Patient here for suspected acalculous cholecystitis. His HIDA scan did now show 

biliary uptake of tracer. However, his LFTs and alk phos have now trended up, 

so surgery is likely warranted. If abdominal pain worsens, check lipase. Will 

need Cardiology on board for pre-op surgical clearance. Further mgmt per Gen 

Surg and cardiology recs.

## 2019-09-04 NOTE — PRG
DATE OF SERVICE:  09/04/2019



SUBJECTIVE:  I am seeing Mr. Mcmullen today, a 59-year-old man with significant

coronary artery disease history. 



The patient underwent HIDA scan yesterday, which failed to visualize the

gallbladder, common bile duct, or small bowel activity.  Meanwhile, LFTs are now

elevated.  The patient currently denies any abdominal pain, although he had one

episode last night. 



He denies any nausea or vomiting. 



He denies any fevers or chills.



OBJECTIVE:  VITAL SIGNS:  Currently include blood pressure 107/59, pulse 56,

respiratory rate is 14, temperature 97.7 degrees Fahrenheit, and oxygen saturation

96% on room air. 

HEART:  Reveals regular rate with mild bradycardia.  No murmurs or gallops

auscultated. 

LUNGS:  Clear to auscultation bilaterally.  Breathing, regular and nonlabored. 

ABDOMEN:  Soft, nontender, and nondistended. 

NEUROLOGIC:  Reveals no focal deficits present.



LABORATORY FINDINGS:  Include a CBC with 10,000 white blood cells, hemoglobin and

hematocrit 11.3 and 33.6 respectively. 



Platelet count is 233,000. 



Metabolic profile; sodium 137, potassium 3.5, chloride is 101, bicarb is 21, BUN is

17, creatinine is 0.95, glucose is 176, total bilirubin is 3.7 with a direct

bilirubin of 2.8, AST and ALT now elevated at 120 and 188 respectively. 



Alkaline phosphatase is also elevated at 370. 



The LFT pattern suggests a biliary obstruction; however, I am concerned about the

findings in the HIDA scan from yesterday, which suggested hepatic dysfunction. 



We will ask Gastroenterology to evaluate this patient.  We will obtain hepatitis

profile and try to minimize any usage of hepatic toxic agents.  Once acute biliary

obstruction has been excluded, we will then make a consideration to the patient's

risk for either laparoscopic cholecystectomy or percutaneous cholecystostomy tube

placement. 



Above findings and plan have been discussed with the patient, who indicates

understanding information given.  I have answered his questions. 







Job ID:  [id number]

## 2019-09-04 NOTE — PRG
DATE OF SERVICE:  



Mr. Mcmullen is a 59-year-old gentleman who consulted General Surgery with suspected

acute acalculous cholecystitis with extensive history of coronary artery disease,

diabetes, and the patient reports doing better.  Pain subsided.  No nausea,

vomiting.  Vitals have been stable.  HIDA scan, resolved most slightly

hepatocellular dysfunction.  No biliary discretion.  Await for Cardiology

consultation.  If any, patient needs cholecystectomy surgery. 







Job ID:  [id number]

## 2019-09-05 LAB
ALBUMIN SERPL BCG-MCNC: 3 G/DL (ref 3.5–5)
ALP SERPL-CCNC: 379 U/L (ref 40–150)
ALT SERPL W P-5'-P-CCNC: 131 U/L (ref 8–55)
ANION GAP SERPL CALC-SCNC: 19 MMOL/L (ref 10–20)
APTT PPP: 26.7 SEC (ref 22.9–36.1)
AST SERPL-CCNC: 56 U/L (ref 5–34)
BASOPHILS # BLD AUTO: 0 THOU/UL (ref 0–0.2)
BASOPHILS NFR BLD AUTO: 0.4 % (ref 0–1)
BILIRUB SERPL-MCNC: 2.5 MG/DL (ref 0.2–1.2)
BUN SERPL-MCNC: 12 MG/DL (ref 8.4–25.7)
CALCIUM SERPL-MCNC: 8.5 MG/DL (ref 7.8–10.44)
CHLORIDE SERPL-SCNC: 102 MMOL/L (ref 98–107)
CO2 SERPL-SCNC: 21 MMOL/L (ref 22–29)
CREAT CL PREDICTED SERPL C-G-VRATE: 147 ML/MIN (ref 70–130)
EOSINOPHIL # BLD AUTO: 0.2 THOU/UL (ref 0–0.7)
EOSINOPHIL NFR BLD AUTO: 1.9 % (ref 0–10)
GLOBULIN SER CALC-MCNC: 2.7 G/DL (ref 2.4–3.5)
GLUCOSE SERPL-MCNC: 191 MG/DL (ref 70–105)
HGB BLD-MCNC: 11.7 G/DL (ref 14–18)
INR PPP: 1.1
LIPASE SERPL-CCNC: 210 U/L (ref 8–78)
LYMPHOCYTES # BLD: 1 THOU/UL (ref 1.2–3.4)
LYMPHOCYTES NFR BLD AUTO: 10.9 % (ref 21–51)
MCH RBC QN AUTO: 29.1 PG (ref 27–31)
MCV RBC AUTO: 89.2 FL (ref 78–98)
MONOCYTES # BLD AUTO: 0.6 THOU/UL (ref 0.11–0.59)
MONOCYTES NFR BLD AUTO: 7.1 % (ref 0–10)
NEUTROPHILS # BLD AUTO: 7 THOU/UL (ref 1.4–6.5)
NEUTROPHILS NFR BLD AUTO: 79.8 % (ref 42–75)
PLATELET # BLD AUTO: 255 THOU/UL (ref 130–400)
POTASSIUM SERPL-SCNC: 3.8 MMOL/L (ref 3.5–5.1)
PROTHROMBIN TIME: 14.5 SEC (ref 12–14.7)
RBC # BLD AUTO: 4.03 MILL/UL (ref 4.7–6.1)
SODIUM SERPL-SCNC: 138 MMOL/L (ref 136–145)
WBC # BLD AUTO: 8.7 THOU/UL (ref 4.8–10.8)

## 2019-09-05 PROCEDURE — 0FT44ZZ RESECTION OF GALLBLADDER, PERCUTANEOUS ENDOSCOPIC APPROACH: ICD-10-PCS | Performed by: SURGERY

## 2019-09-05 RX ADMIN — INSULIN LISPRO PRN UNIT: 100 INJECTION, SOLUTION INTRAVENOUS; SUBCUTANEOUS at 19:32

## 2019-09-05 RX ADMIN — SACUBITRIL AND VALSARTAN SCH TAB: 49; 51 TABLET, FILM COATED ORAL at 21:08

## 2019-09-05 RX ADMIN — SACUBITRIL AND VALSARTAN SCH: 49; 51 TABLET, FILM COATED ORAL at 11:24

## 2019-09-05 NOTE — ULT
ULTRASOUND HEPATIC DOPPLER:

 

Date:  09/05/19 

 

HISTORY:  

Right upper quadrant pain, nausea, vomiting, diarrhea, increase in liver function tests. 

 

COMPARISON:  

Ultrasound dated 09/02/19 and nuclear medicine study dated 09/03/19. 

 

FINDINGS:

Visualized portions of the pancreas, aorta, and IVC are unremarkable. Diffuse increased hepatic echot
exture. 

 

Portal vein is patent with antegrade flow. Normal phasicity and systolic velocity. The hepatic artery
 is patent. 

 

Right and left portal veins are patent. Liver measures 22 cm in length. Circumferential wall thickeni
ng of the gallbladder without distention. 

 

Cyst inferior pole right kidney measuring up to 3.7 cm. No hydronephrosis or right renal mass. Normal
 size of the right kidney, which measures 13.0 x 6.0 x 6.1 cm. 

 

Common bile duct normal, measuring 3.0 mm. Hepatic veins are patent. 

 

Spleen not enlarged, measuring 11.1 cm in length. Artery and vein are patent. 

 

Left kidney measures 12.0 x 5.6 x 5.4 cm with an inferior pole cyst measuring 2.6 cm. 

 

Nonobstructing calculus inferior left renal collecting system. 

 

 

IMPRESSION: 

 

1.  Diffuse increase hepatic echotexture and hepatomegaly suggesting hepatocellular dysfunction which
 may be from steatosis or hepatitis. 

 

2.  Circumferential wall thickening of a nondistended gallbladder suggesting third spacing and conges
tive changes from hepatocellular dysfunction. 

 

3.  Nonobstructing calculus inferior pole left kidney. 

 

4.  Bilateral renal cysts. 

 

5.  Adequate velocity and normal directional flow within the hepatic artery, hepatic veins, and tono
l veins. 

 

 

 

POS: CET

## 2019-09-05 NOTE — PDOC.FM
- Subjective


Subjective: 





pt resting comfortably in bed, denies abdominal pain or nausea 





- Objective


Vital Signs & Weight: 


 Vital Signs (12 hours)











  Temp Pulse Resp BP Pulse Ox


 


 09/05/19 03:05  98.1 F  65  14  123/58 L  95


 


 09/04/19 23:02   53 L   


 


 09/04/19 20:00      98


 


 09/04/19 19:41  98.0 F  53 L  16  121/58 L  98








 Weight











Admit Weight                   109.452 kg


 


Weight                         111.039 kg














I&O: 


 











 09/03/19 09/04/19 09/05/19





 06:59 06:59 06:59


 


Intake Total  2710 3478


 


Output Total  400 400


 


Balance  2310 3078











Result Diagrams: 


 09/05/19 05:42





 09/05/19 05:42





Phys Exam





- Physical Examination


Constitutional: NAD


HEENT: moist MMs


Neck: full ROM


Gastrointestinal: no distention


Musculoskeletal: no edema


Neurological: moves all 4 limbs


Psychiatric: normal affect


Skin: no rash





Dx/Plan


(1) Atrial fibrillation


Code(s): I48.91 - UNSPECIFIED ATRIAL FIBRILLATION   Status: Acute   





(2) Coronary artery disease


Code(s): I25.10 - ATHSCL HEART DISEASE OF NATIVE CORONARY ARTERY W/O ANG PCTRS 

  Status: Acute   





(3) Diabetes mellitus type 2 with complications


Code(s): E11.8 - TYPE 2 DIABETES MELLITUS WITH UNSPECIFIED COMPLICATIONS   

Status: Acute   





(4) Acute acalculous cholecystitis


Code(s): K81.0 - ACUTE CHOLECYSTITIS   Status: Suspected   





- Plan


Plan: 





Suspected Acute Acalculous Cholecystitis


WBC count WNL. Afebrile. s/p zosyn in ED


-Dr. Bryant with general surgery has been consulted, appreciate recs


-US showed thickened GB wall, HIDA abdnormal, continue zosyn 


- ALT/AST uptrending 


- GI Dr. Pittman consulted, appreciate recs 





Elevated troponin, downtrended


Pt with h/o 5v CABG


-Repeat EKG and trops for any chest pain, currently asymptomatic





CAD s/p CABG and stents


-Continue medications, but hold aspirin and plavix for surgery


- consult cards, appreciate recs





DM2


-continue home meds


-Accuchecks q6h


-SSI





Paroxysmal a-fib


-continue home meds





CHF


-Daily weights


-Strict I/O's


-Continue home meds





Code status: Full





Dispo: continue to eval for surgical intervention





Addendum - Attending





- Attending Attestation


Date/Time: 09/05/19 4468





I personally evaluated the patient and discussed the management with Dr. Ghosh. 


I agree with the History, Examination, Assessment and Plan documented above 

with any addition or exceptions noted below.





Patient overall stable. Had diarrhea yesterday so CDiff screen pending and on 

precautions. Continue Zosyn for now. Awaiting GI and Gen Surgery recs regarding 

management of his biliary dysfunction. He has been cleared by Cardiology for 

surgery. We are currently waiting on specialist input for further mgmt.

## 2019-09-05 NOTE — OP
DATE OF PROCEDURE:  09/05/2019



PREOPERATIVE DIAGNOSIS:  Acute acalculous cholecystitis.



POSTOPERATIVE DIAGNOSIS:  Acute gangrenous acalculous cholecystitis.



ANESTHESIA:  General endotracheal.



ESTIMATED BLOOD LOSS:  50 mL.



FLUIDS GIVEN:  220 mL of platelets and 280 mL of crystalloids.



COUNTS:  Sponge and instrument counts were verified as correct x2.



COMPLICATIONS:  None apparent to operation.



OPERATION PERFORMED:  Laparoscopic cholecystectomy.



INDICATIONS FOR OPERATION:  This is a 59-year-old man with history of severe

coronary artery disease and diabetes mellitus, who presented with recurrent

epigastric right upper quadrant abdominal pain. 



Clinical radiographic examination was suggestive of acute acalculous cholecystitis,

likely with choledocholithiasis for which the patient was brought to the operating

room for planned laparoscopic cholecystectomy, intraoperative cholangiogram. 



Findings are however consistent with markedly dilated gallbladder in the usual

anatomic location completely encased by dense omental adhesions. 



The cystic duct was quite small, difficult to cannulate for cholangiogram;

therefore, cholangiogram was not performed. 



DESCRIPTION OF PROCEDURE:  Informed consent was obtained from the patient and he was

brought to the operating room and placed in supine position. 



Following general anesthesia, abdomen was sterilely prepped and draped in usual

fashion. 



The skin below the umbilicus was infiltrated with 0.25% Marcaine with epinephrine. 



A small curvilinear infraumbilical incision was made using 11 scalpel. 



Umbilical stalk grasped with Kocher and elevated. 



Veress needle was inserted through the incision and placed in the peritoneal cavity

through which the abdomen was insufflated with 4 L of CO2 gas. 



Intraabdominal pressure was noted at 1 mmHg. 



Following abdominal insufflation, Veress needle was removed and a 5 mm trocar

introduced using a Visiport under laparoscopy. 



Laparoscopy confirmed proper placement of the port.  No injuries to underlying

structures. 



Additional laparoscopy reveals the right upper quadrant completely encased by

omental adhesions. 



Under direct laparoscopy, a 12 mm epigastric and two 5 mm right lateral subcostal

ports were placed after the overlying skin was infiltrated with 0.25% Marcaine with

epinephrine.  Appropriate incision was made. 



The patient was placed in a reverse Trendelenburg position, rotated to his left. 



I introduced a [place] dissector with cautery, using this to take down omental

adhesions to reveal the fundus of the gallbladder. 



The Prestige grasper was introduced through the right lateral subcostal port

grasping the fundus of the gallbladder which was elevated cephalad. 



Omental adhesions were then tediously taken down from remainder of the gallbladder.

There was a fair amount of oozing in the operative site; therefore, given the

patient's history of being on Plavix, 6 packs of platelets were given which aided

hemostasis. 



The cystic duct was then carefully dissected free from surrounding structures,

identifying the triangle of Calot. 



The cystic artery was also dissected free from surrounding structures and the artery

was divided between clips, applying 2 clips proximally and one clip at the junction

of the cystic artery and gallbladder. 



The cystic duct was very small and difficult to cannulate for cholangiogram;

therefore, we elected to abort cholangiography. 



The cystic duct was then divided between clips applying 2 clips proximally and one

clip at the junction of the cystic duct and gallbladder. 



The gallbladder itself was removed from the liver bed using cautery. 



The back wall of the gallbladder was quite necrotic. 



The gallbladder was delivered of the abdominal cavity using an EndoCatch. 



Operative site was irrigated with saline. 



Minor oozing in the gallbladder fossa was readily controlled using Mary. 



Finding no other pathology, laparoscopy was terminated. 



Fascia of the epigastric port was closed using 0 Vicryl suture and Endoclosure

device on the laparoscopy. 



The fascia was again closed anteriorly using interrupted suture of 2-0 Vicryl using

a UR6 needle. 



Prior to fascia closure, #19 Landon drain was introduced into the right upper

quadrant and allowed to exit the abdominal cavity through the right lateral

subcostal port. 



The drain was secured to anterior abdominal wall using 2-0 silk suture. 



Once the abdomen was desufflated, remainder of the incisions were closed using

interrupted sutures of 4-0 Monocryl suture in subcuticular fashion. 



Dermabond was applied over incisional closure. 



Drain gauze was applied over the drain site. 



The patient tolerated the operation without any apparent complication and was

returned to the recovery room in satisfactory condition. 







Job ID:  [id number]

## 2019-09-05 NOTE — CON
DATE OF CONSULTATION:  



REASON FOR CONSULT:  Elevated liver enzymes.



HISTORY OF PRESENT ILLNESS:  Mr. Mcmullen is a 59-year-old gentleman who has been

seen by our service in the past for screening colonoscopy in 2016, and had a few

polyps removed.  Since then, he has not been seen. More recently, he developed

abdominal pain and came to the hospital on 08/29.  He reports that he has been

having right upper quadrant epigastric pain that radiates to his back

intermittently, worse with eating for several days with the pain got severe, he came

to the hospital.  Here, he had stable vital signs and no fever on presentation.  He

had a CT dissection protocol that showed a distended gallbladder.  He also was shown

to have mild cardiomegaly and enlarged liver with diffuse fatty infiltration.  No

common bile duct dilatation.  After this, he had an ultrasound that again showed a

distended gallbladder.  No gallbladder wall thickening, 5 mm common bile duct.  No

gallstones.  He returned to the emergency room on 09/02. At that time, [facility] admitted him for the same symptoms. When he returned, he had a white count

of 13.9, indeterminate troponin, normal liver enzymes.  He has been watched by

General Surgery.  He had a HIDA scan that showed nonfilling of the biliary tree or

gallbladder.  The delayed films showed the same.  The radiologist felt this is

possibly from hepatic dysfunction.  This study was performed on 09/02. His blood

pressure had been documented low a few times here on the floor.  It is unclear to me

what it was in the emergency room but on 09/03, it was 93/50, and 107/59 today.

Apparently, he was started on some fluids yesterday he states.  His pain is still

present, not as bad as it was.  He is not really eating very much. 



PAST MEDICAL HISTORY:  Cardiomyopathy with previous defibrillator placement.  He

reports he sees Dr. Banks and then he has had EF around 30% to 40%.  Coronary

artery disease with previous stents, diabetes, atrial fibrillation, history of

spinal stenosis, rheumatoid arthritis, paroxysmal atrial fibrillation. 



PAST SURGICAL HISTORY:  Five-vessel CABG, AICD placement, colonoscopy with

polypectomy. 



FAMILY HISTORY:  Hypertension, diabetes, and coronary artery disease.



SOCIAL HISTORY:  Denies alcohol, drugs, or tobacco.



PRIMARY CARE:  [facility] to [facility].



REVIEW OF SYSTEMS:  Negative for fever, chills, dysuria, frequency, urgency, prior

history of icterus, hepatitis, no jaundice, sick contacts, contacts with anybody

with acute hepatitis, nausea, vomiting, diarrhea.  He denies chest pain or shortness

of breath now. 



MEDICATIONS:  

1. Vascepa.

2. Atorvastatin.

3. Coreg.

4. D5W.

5. Lanoxin.

6. Humalog.

7. Ivabradine.

8. LR at 100.

9. Morphine p.r.n.

10. Zofran p.r.n.

11. Zosyn.

12. Entresto.

13. Sotalol. 

Home medications;

1. Metformin.

2. Insulin.

3. Victoza.

4. Carvedilol.

5. Plavix.

6. Lipitor.

7. Digoxin.

8. Sotalol.

9. Oxycodone.

10. Glipizide.

11. Vascepa. 

12. Corlanor.

13. Entresto.



PHYSICAL EXAMINATION:

VITAL SIGNS:  Temperature 98, pulse 58, blood pressure 93/50. 

GENERAL: He is resting comfortably in bed. 

HEENT:  He is nonicteric.  Oropharynx, no lesions. 

NECK:  Supple.  No adenopathy. 

LUNGS:  Clear. 

HEART: Regular rate and rhythm without clicks or murmurs. 

ABDOMEN:  Soft.  There is mild tenderness in right upper quadrant.  There is no

rebound.  There is no guarding. 

EXTREMITIES:  There is no peripheral edema.  There is no JVD noted.



LABORATORY STUDIES:  Today, white count 10, hemoglobin 11, platelet count 223, MCV

is 88.  Sodium 137, potassium 3.5, bicarb 21, BUN 17, creatinine 0.9, glucose 176.

Bilirubin is 3.7, AST and ALT are 120 and 188 with alkaline phosphatase of 370,

bilirubin direct is 2.8, albumin is 2.9, protein is 5.6. On the 2nd, he had normal

albumin, normal LFTs. 



ASSESSMENT:  This is a 59-year-old with significant heart disease and heart failure

who is admitted with right upper quadrant pain, fairly consistent with biliary

colic, who has multiple imaging studies that showed a dilated gallbladder, normal

common bile duct, normal LFTs and lipase on admission.  He has was sent home and now

he is back.  He has a HIDA scan that does not show any filling of the gallbladder or

biliary system.  The radiologist felt this was from "intrinsic liver disease."  This

would be consistent with acute cholecystitis.  He could have some hepatic

dysfunction related to heart failure, __________ passive congestion or ischemic

hepatopathy as he has been hypotensive here.  He shows no overt signs of sepsis. 



RECOMMENDATIONS:  

1. I would get a cardiology consult today.  I would get an echocardiogram today to

 his cardiac risks  as he is going to need a surgery, an ERCP or a

cholecystostomy tube most likely. 

2. He has had hepatitis panels which have been negative.  I would repeat his LFTs in

the morning.  I will repeat his liver ultrasound with Dopplers to evaluate his

hepatic flow and ask for assessment of the IVC for signs of passive congestion of

the liver. 

I will also recheck the common bile duct for any signs of obstruction.  At this

point in time, the patient shows no evidence of cholangitis.  It is unclear if this

is an increased LFT secondary to obstructive process or hepatocellular process.  We

will follow along with you. 







Job ID:  [id number]

## 2019-09-05 NOTE — PRG
DATE OF SERVICE:  09/05/2019



SUBJECTIVE:  Mr. Mcmullen has had some recurrence of right upper quadrant pain and

nausea.  No vomiting.  Today, he is afebrile.  Plan is for laparoscopic

cholecystectomy with intraoperative cholangiogram later today.  LFTs declined

marginally with total bilirubin down to 2.5. 



PHYSICAL EXAMINATION:

VITAL SIGNS:  Temperature 97.9, pulse 88, blood pressure 132/71, and 95% oxygen

saturation on room air. 

GENERAL:  In no acute distress. 

HEART:  Regular rate and rhythm. 

LUNGS:  Clear to auscultation bilaterally. 

ABDOMEN:  Bowel sounds present.  Soft.  Some tenderness to palpation in the upper

abdomen. 

EXTREMITIES:  No peripheral edema.



LABORATORY STUDIES:  WBC 8.7, hemoglobin 11.7, platelets 255.  INR is 1.1.  Sodium

138, potassium 3.8, BUN 12, creatinine 0.85.  Total bilirubin down to 2.5, alkaline

phosphatase 379, AST down to 56, ALT down to 131.  Lipase is 210.  Note that on

admission, all LFTs and lipase were normal.  Viral hepatitis serologies all

negative.  Urinalysis, negative.  Abdominal ultrasound from earlier today

demonstrates a common bile duct still normal in caliber at only 3 mm.  There is

heterogeneity of the liver suggesting hepatic steatosis or hepatitis.  There is

circumferential wall thickening of nondistended gallbladder. 



ASSESSMENT AND PLAN:  

1. Probable acute acalculous cholecystitis.

2. LFT elevation, acute, but downtrending.  Note, there is no demonstration of

gallstones or common bile duct dilation on repeated abdominal ultrasound imaging.

The fact that the LFTs were normal on admission and that he has no known history of

prior liver dysfunction, argues against significant hepatic dysfunction.  If this

were related to drug toxicity, I would not expect the LFTs to improve today.  I

agree with the plan for laparoscopic cholecystectomy.  Intraoperative cholangiogram

is 

planned.  I discussed with the patient and Dr. Bryant that if the cholangiogram is

positive for biliary obstruction, we could proceed with ERCP under the same

anesthesia.  I discussed this in detail with the patient and he desires to proceed.

GI will continue to follow along. 







Job ID:  [id number]

## 2019-09-06 LAB
ALBUMIN SERPL BCG-MCNC: 2.8 G/DL (ref 3.5–5)
ALP SERPL-CCNC: 356 U/L (ref 40–150)
ALT SERPL W P-5'-P-CCNC: 97 U/L (ref 8–55)
ANION GAP SERPL CALC-SCNC: 14 MMOL/L (ref 10–20)
AST SERPL-CCNC: 51 U/L (ref 5–34)
BILIRUB DIRECT SERPL-MCNC: 1.6 MG/DL (ref 0.1–0.3)
BILIRUB SERPL-MCNC: 2 MG/DL (ref 0.2–1.2)
BUN SERPL-MCNC: 9 MG/DL (ref 8.4–25.7)
CALCIUM SERPL-MCNC: 8.2 MG/DL (ref 7.8–10.44)
CHLORIDE SERPL-SCNC: 100 MMOL/L (ref 98–107)
CO2 SERPL-SCNC: 26 MMOL/L (ref 22–29)
CREAT CL PREDICTED SERPL C-G-VRATE: 127 ML/MIN (ref 70–130)
GLUCOSE SERPL-MCNC: 254 MG/DL (ref 70–105)
HGB BLD-MCNC: 11.1 G/DL (ref 14–18)
MAGNESIUM SERPL-MCNC: 1.7 MG/DL (ref 1.6–2.6)
MCH RBC QN AUTO: 29.5 PG (ref 27–31)
MCV RBC AUTO: 87.5 FL (ref 78–98)
MDIFF COMPLETE?: YES
PLATELET # BLD AUTO: 270 THOU/UL (ref 130–400)
POTASSIUM SERPL-SCNC: 3.7 MMOL/L (ref 3.5–5.1)
RBC # BLD AUTO: 3.77 MILL/UL (ref 4.7–6.1)
SODIUM SERPL-SCNC: 136 MMOL/L (ref 136–145)
WBC # BLD AUTO: 9.9 THOU/UL (ref 4.8–10.8)

## 2019-09-06 RX ADMIN — SACUBITRIL AND VALSARTAN SCH TAB: 49; 51 TABLET, FILM COATED ORAL at 09:27

## 2019-09-06 RX ADMIN — Medication SCH ML: at 20:47

## 2019-09-06 RX ADMIN — INSULIN LISPRO PRN UNIT: 100 INJECTION, SOLUTION INTRAVENOUS; SUBCUTANEOUS at 19:10

## 2019-09-06 RX ADMIN — INSULIN LISPRO PRN UNIT: 100 INJECTION, SOLUTION INTRAVENOUS; SUBCUTANEOUS at 00:23

## 2019-09-06 RX ADMIN — Medication SCH ML: at 09:27

## 2019-09-06 RX ADMIN — SACUBITRIL AND VALSARTAN SCH TAB: 49; 51 TABLET, FILM COATED ORAL at 20:47

## 2019-09-06 NOTE — PRG
DATE OF SERVICE:  09/05/2019



SUBJECTIVE:  Mr. Mcmullen is a 59-year-old male who comes in to evaluation of right

upper quadrant abdominal pain.  He was found to have cholecystitis and elevated LFT.

 He has history of substantial coronary artery disease and he also had 7 episode of

loose stool and diarrhea yesterday in which he was put under C. diff precaution.

The patient underwent laparoscopic cholecystectomy today with Dr. Bryant.  During the

procedure, the patient was found to have acute gangrenous acalculous cholecystitis.

Postop, the patient has been doing good.  He developed no fever or shortness of

breath.  Vital signs have been stable.  He developed no chest pain.  He had not had

any episode of diarrhea since last night.  His vital signs have been stable.  He has

drainage from cholecystectomy incision is around 30 mL with light red in color.  The

patient was seen and evaluated on round this evening, the patient lying down in bed

comfortably with no acute distress. 



PHYSICAL EXAMINATION:

LUNGS:  Clear bilaterally. 

HEART:  Regular rate and rhythm. 

ABDOMEN:  Soft, asymmetric.  Tender to touch of the incision sites.  No peritoneal

signs developed. 

EXTREMITY:  Neurovascularly intact. 

NEUROLOGIC:  No neuro focal deficits.



PLAN:  Would be to continue supportive care.  Continue gastritis prophylaxis.

Continue pain control.  The patient is on a clear-liquid diet.  Anticipate he can be

full-liquid diet or regular diet as tolerated tomorrow.  Await for C. difficile

assay results. 







Job ID:  [id number]

## 2019-09-06 NOTE — PDOC.CPN
- Subjective


Date: 09/06/19


Time: 09:43


Interval history: 





The pt seen and examined.  No overnight events.  No cardiac complaints.





- Objective


Allergies/Adverse Reactions: 


 Allergies











Allergy/AdvReac Type Severity Reaction Status Date / Time


 


Penicillins Allergy Intermediate Rash Verified 09/03/19 18:12











Visit Medications: 


 Current Medications





Carvedilol (Coreg)  25 mg PO BID [facility]


   Last Admin: 09/06/19 09:26 Dose:  25 mg


Dextrose/Water (Dextrose 50%)  25 gm SLOW IVP PRN PRN


   PRN Reason: Hypoglycemia


Digoxin (Lanoxin)  0.125 mg PO DAILY [facility]


   Last Admin: 09/06/19 09:25 Dose:  0.125 mg


Glucagon (Glucagon)  1 mg IM PRN PRN


   PRN Reason: Hypoglycemia


Dextrose/Water (D5w)  1,000 mls @ 0 mls/hr IV .Q0M PRN


   PRN Reason: Hypoglycemia


Lactated Ringer's (Lactated Ringer's)  1,000 mls @ 100 mls/hr IV .Q10H [facility]


   Last Admin: 09/06/19 01:36 Dose:  1,000 mls


Magnesium Sulfate 3 gm/ Sodium (Chloride)  106 mls @ 53 mls/hr IVPB NOW [facility]


   Stop: 09/06/19 12:00


Insulin Human Lispro (Humalog)  0 units SC .MODERATE SLIDING SC PRN


   PRN Reason: Moderate Correctional Scale


   Last Admin: 09/06/19 00:23 Dose:  6 unit


Ivabradine (Corlanor)  2.5 mg PO BID [facility]


   Last Admin: 09/06/19 09:26 Dose:  2.5 mg


Morphine Sulfate (Morphine)  4 mg SLOW IVP Q4H PRN


   PRN Reason: Severe Pain (7-10)


   Last Admin: 09/06/19 09:27 Dose:  4 mg


(Icosapent Ethyl [ (Vascepa] 2 Cap))  2 cap PO BID [facility]


Ondansetron HCl (Zofran Odt)  4 mg PO Q6H PRN


   PRN Reason: Nausea/Vomiting


   Last Admin: 09/04/19 17:31 Dose:  4 mg


Sacubitril/Valsartan (Entresto 49 Mg-51 Mg Tablet)  1 tab PO BID [facility]


   Last Admin: 09/06/19 09:27 Dose:  1 tab


Sodium Chloride (Flush - Normal Saline)  10 ml IVF Q12HR [facility]


   Last Admin: 09/06/19 09:27 Dose:  10 ml


Sodium Chloride (Flush - Normal Saline)  10 ml IVF PRN PRN


   PRN Reason: Saline Flush


Sotalol HCl (Betapace)  40 mg PO BID [facility]


   Last Admin: 09/06/19 09:25 Dose:  40 mg








Vital Signs & Weight: 


 Vital Signs











  Temp Pulse Resp BP Pulse Ox


 


 09/06/19 09:25   64   


 


 09/06/19 08:40  98.4 F  64  16  125/67  95


 


 09/06/19 04:50  99.4 F  68  14  123/57 L  97


 


 09/06/19 00:00     123/60 








 











Admit Weight                   241 lb 4.8 oz


 


Weight                         248 lb 8 oz

















- Physical Exam


General: alert & oriented x3


HEENT: mucus membranes moist


Neck: supple neck


Cardiac: regular rate and rhythm, S1/S2


Lungs: clear to auscultation


Skin: clear


Musculoskeletal: normal range of motion





- Labs


Result Diagrams: 


 09/06/19 06:25





 09/06/19 06:25


 Troponin/CKMB











CK-MB (CK-2)  1.4 ng/mL (0-6.6)   09/02/19  17:14    


 


Troponin I  0.074 ng/mL (< 0.028)  H  09/02/19  20:35    














- Telemetry


Sinus rhythms and dysrhythmias: sinus rhythm (SR with V paced)





- Assessment/Plan


Assessment/Plan: 





1. Prox Afib - remains in SR with V paced; On Coreg, Digoxin, Sotalol; Will 

resume ASA 81mg qd once cleared by Surgery team


2. Chronic systolic HF with AICD placement - stable; on Coreg, Corlanor; 


3. s/p Ex lap cholecystectomy on 09/05/2019 


4. CAD wtih hx of stent - stable; on Coreg; will resume Statin, Plavix, and ASA 

81mg qd once cleared by Surgery team


5. DM type 2 - managed by PCP


MAR reviewed














Pt. seen and eval. by me. He feels better today. still some abd. discomfort. No 

arhythmias. Cardiac status is stable. I agree with the A/P by the NP. I will 

sign off. If any cardiac issues then please contact us again.

## 2019-09-06 NOTE — PRG
DATE OF SERVICE:  09/06/2019



SUBJECTIVE:  Mr. Mcmullen is a 59-year-old man, postop day #1, status post

laparoscopic cholecystectomy for acute gangrenous acalculous cholecystitis. 



The patient is awake and alert today, reports adequate pain control. 



He tolerates clear liquid diet.



OBJECTIVE:  VITAL SIGNS:  Today include blood pressure 125/67, pulse 64, 
respiratory

rate 16, temperature is 98.4 degrees Fahrenheit, oxygen saturation 95% on 2 L by

nasal cannula oxygen. 

HEART:  Reveals regular rate and rhythm. 

LUNGS:  Clear to auscultation bilaterally.  Breathing, regular and nonlabored. 

ABDOMEN:  Soft with incisional tenderness to palpation.  He has no gross 
peritoneal

signs on examination.  Kody-Matamoros drain returns 60 mL of serous fluid. 

NEUROLOGIC:  Reveals no focal deficits present.



LABORATORY FINDINGS:  Include a CBC with 9900 white blood cells, hemoglobin and

hematocrit are stable at 11.1 and 33.0 respectively. 



Platelet count is 270,000. 



Differential count as follows; 77% segmented neutrophils, 3 bands, 19 
lymphocytes,

and 1 monocyte. 



Metabolic profile; sodium 136, potassium 3.7, chloride is 100, bicarb is 26, 
BUN is

9, creatinine 0.98, glucose 254, magnesium is 1.7, phosphorus is 3.1, total

bilirubin is slightly decreased at 2.0, AST and ALT are resolving at 51 and 97

respectively. 



IMPRESSION:  

1. Postoperative day #1, status post laparoscopic cholecystectomy.

2. Acute gangrenous acalculous cholecystitis.

3. Acute hypokalemia.

4. Acute hypomagnesemia.

5. Acute hypophosphatemia.



PLAN:  

1. Correct abnormal electrolytes.

2. Advance diet as tolerated.

3. Increase activity per Physical and Occupational Therapy. 

Discussed with Gastroenterology, we will continue to monitor the patient as 
there

seems to be no acute indication today for ERCP. 



Above findings and plan discussed with the patient and family at bedside. 



They indicated understanding of information given.   



I have answered his questions.







Job ID:  [id number]



[facility]

## 2019-09-06 NOTE — PDOC.FM
- Subjective


Subjective: 





pt resting comfortably in bed, denies sob, reports pain





- Objective


Vital Signs & Weight: 


 Vital Signs (12 hours)











  Temp Pulse Resp BP BP Pulse Ox


 


 09/06/19 04:50  99.4 F  68  14   123/57 L  97


 


 09/06/19 00:00      123/60 


 


 09/05/19 21:06   84   180/79 H  


 


 09/05/19 20:00  97.5 F L  84  18   180/79 H  95








 Weight











Admit Weight                   109.452 kg


 


Weight                         111.039 kg














I&O: 


 











 09/04/19 09/05/19 09/06/19





 06:59 06:59 06:59


 


Intake Total 2710 3478 1360


 


Output Total 400 400 680


 


Balance 2310 3078 680











Result Diagrams: 


 09/06/19 06:25





 09/06/19 06:25





Phys Exam





- Physical Examination


Constitutional: NAD


HEENT: moist MMs


Neck: supple


Gastrointestinal: soft


ttp


Musculoskeletal: no edema


Neurological: moves all 4 limbs


Psychiatric: normal affect


Skin: no rash





Dx/Plan


(1) Atrial fibrillation


Code(s): I48.91 - UNSPECIFIED ATRIAL FIBRILLATION   Status: Acute   





(2) Coronary artery disease


Code(s): I25.10 - ATHSCL HEART DISEASE OF NATIVE CORONARY ARTERY W/O ANG PCTRS 

  Status: Acute   





(3) Diabetes mellitus type 2 with complications


Code(s): E11.8 - TYPE 2 DIABETES MELLITUS WITH UNSPECIFIED COMPLICATIONS   

Status: Acute   





(4) Acute acalculous cholecystitis


Code(s): K81.0 - ACUTE CHOLECYSTITIS   Status: Suspected   





- Plan


Plan: 





Suspected Acute Acalculous Cholecystitis


WBC count WNL. Afebrile. ANA MARIA castellanos 


-Dr. Bryant with general surgery has been consulted, appreciate recs


-US showed thickened GB wall, HIDA abdnormal 


- ALT/AST remain elevated, consider ERCP today


- GI Dr. Pittman consulted, appreciate recs 





Elevated troponin, downtrended


Pt with h/o 5v CABG


-Repeat EKG and trops for any chest pain, currently asymptomatic





CAD s/p CABG and stents


-Continue medications, resume aspirin and plavix per surgery


- consult cards, appreciate recs





DM2


-continue home meds


-Accuchecks q6h


-SSI





Paroxysmal a-fib


-continue home meds





CHF


-Daily weights


-Strict I/O's


-Continue home meds





Code status: Full





Dispo: s/p lap sun, further eval today for CBD stone, DC per surgery





Addendum - Attending





- Attending Attestation


Date/Time: 09/06/19 0458





I personally evaluated the patient and discussed the management with Dr. Ghosh. 


I agree with the History, Examination, Assessment and Plan documented above 

with any addition or exceptions noted below.





Patient overall doing well, has some mild pain this morning. Underwent Lap 

sun yesterday. He continues to be NPO at this time and awaiting repeat labs 

this morning. Possible MRCP/ERCP if labs are still consistent with biliary 

dysfunction. Awaiting those results and further GI/Gen Surg recs. Cardiac 

function stable, wean O2 as tolerated.

## 2019-09-07 VITALS — DIASTOLIC BLOOD PRESSURE: 63 MMHG | TEMPERATURE: 98.1 F | SYSTOLIC BLOOD PRESSURE: 121 MMHG

## 2019-09-07 LAB
ALBUMIN SERPL BCG-MCNC: 2.9 G/DL (ref 3.5–5)
ALP SERPL-CCNC: 332 U/L (ref 40–150)
ALT SERPL W P-5'-P-CCNC: 74 U/L (ref 8–55)
ANION GAP SERPL CALC-SCNC: 14 MMOL/L (ref 10–20)
AST SERPL-CCNC: 29 U/L (ref 5–34)
BILIRUB DIRECT SERPL-MCNC: 1.1 MG/DL (ref 0.1–0.3)
BILIRUB SERPL-MCNC: 1.4 MG/DL (ref 0.2–1.2)
BUN SERPL-MCNC: 7 MG/DL (ref 8.4–25.7)
CALCIUM SERPL-MCNC: 8.2 MG/DL (ref 7.8–10.44)
CHLORIDE SERPL-SCNC: 97 MMOL/L (ref 98–107)
CO2 SERPL-SCNC: 27 MMOL/L (ref 22–29)
CREAT CL PREDICTED SERPL C-G-VRATE: 149 ML/MIN (ref 70–130)
GLUCOSE SERPL-MCNC: 265 MG/DL (ref 70–105)
MAGNESIUM SERPL-MCNC: 2.2 MG/DL (ref 1.6–2.6)
POTASSIUM SERPL-SCNC: 3.5 MMOL/L (ref 3.5–5.1)
SODIUM SERPL-SCNC: 134 MMOL/L (ref 136–145)

## 2019-09-07 RX ADMIN — SACUBITRIL AND VALSARTAN SCH TAB: 49; 51 TABLET, FILM COATED ORAL at 08:26

## 2019-09-07 RX ADMIN — Medication SCH ML: at 08:26

## 2019-09-07 RX ADMIN — INSULIN LISPRO PRN UNIT: 100 INJECTION, SOLUTION INTRAVENOUS; SUBCUTANEOUS at 13:13

## 2019-09-07 NOTE — PRG
DATE OF SERVICE:  09/07/2019



SUBJECTIVE:  Mr. Mcmullen is a 59-year-old male who comes for evaluation of right

upper quadrant abdominal pain.  He was found to have gangrenous cholecystitis and

elevated LFTs.  He has history of coronary artery disease.  He underwent

cholecystectomy with a diagnosis of gangrene acalculous cholecystitis.  Postop, the

patient reports doing better.  Pain is well controlled.  He reports his diarrhea had

stopped.  He has no nausea, vomiting, even though he reports has some burping.  He

does not have any gas or bowel yet.  His vital signs have been stable. 



OBJECTIVE:  GENERAL:  The patient is lying down in bed comfortably with no acute

distress. 

LUNGS:  Clear bilaterally. 

HEART:  Regular rate and rhythm. 

ABDOMEN:  Soft, tender to touch of the incision site.  No peritoneal signs

developed. 

EXTREMITIES:  Neurovascularly intact. 

NEUROLOGIC:  No focal neurology deficits.



PLAN:  Will be to continue supportive care.  Continue gastritis prophylaxis.

Continue bowel rest.  Diet as tolerated.  Increase activity per Physical and

Occupational Therapy. 







Job ID:  [id number]

## 2019-09-07 NOTE — PDOC.FM
- Subjective


Subjective: 





Patient complains of some RUQ overnight, otherwise states he slept okay. Denies 

chest pain, palpitations, SOB. Telemetry overnight showed BBB in 60s bpm, 9 

beats of Vtach, and 20 sec of AIVR. During these episodes nursing staff reports 

that patient was asymptomatic. 





- Objective


Vital Signs & Weight: 


 Vital Signs (12 hours)











  Temp Pulse Resp BP BP BP Pulse Ox


 


 09/07/19 03:15  98.1 F  57 L  20    132/62  98


 


 09/06/19 23:20  97.9 F  65  14   131/65  


 


 09/06/19 20:48   67   129/66   


 


 09/06/19 19:35  98.4 F  67  16   129/66   95








 Weight











Admit Weight                   109.452 kg


 


Weight                         114.169 kg














I&O: 


 











 09/05/19 09/06/19 09/07/19





 06:59 06:59 06:59


 


Intake Total 3478 2910 1120


 


Output Total 400 1370 490


 


Balance 3078 1540 630











Result Diagrams: 


 09/06/19 06:25





 09/07/19 04:27





Phys Exam





- Physical Examination


Constitutional: NAD


HEENT: moist MMs, sclera anicteric


Neck: no JVD, supple, full ROM


Respiratory: no wheezing, no rhonchi, clear to auscultation bilateral


Cardiovascular: RRR, no significant murmur


Gastrointestinal: soft, no distention, positive bowel sounds


TTP over RUQ, with minimal guarding


Musculoskeletal: no edema, pulses present


Neurological: moves all 4 limbs


Psychiatric: normal affect, A&O x 3


Skin: no rash, normal turgor





Dx/Plan


(1) Atrial fibrillation


Code(s): I48.91 - UNSPECIFIED ATRIAL FIBRILLATION   Status: Acute   


Qualifiers: 


   Atrial fibrillation type: unspecified   Qualified Code(s): I48.91 - 

Unspecified atrial fibrillation   





(2) Coronary artery disease


Code(s): I25.10 - ATHSCL HEART DISEASE OF NATIVE CORONARY ARTERY W/O ANG PCTRS 

  Status: Acute   


Qualifiers: 


   Coronary Disease-Associated Artery/Lesion type: unspecified vessel or lesion 

type   Native vs. transplanted heart: native heart   Associated angina: with 

unspecified angina   Qualified Code(s): I25.119 - Atherosclerotic heart disease 

of native coronary artery with unspecified angina pectoris   





(3) Diabetes mellitus type 2 with complications


Code(s): E11.8 - TYPE 2 DIABETES MELLITUS WITH UNSPECIFIED COMPLICATIONS   

Status: Acute   





(4) Leukocytosis


Code(s): D72.829 - ELEVATED WHITE BLOOD CELL COUNT, UNSPECIFIED   Status: Acute

   


Qualifiers: 


   Leukocytosis type: other   Qualified Code(s): D72.828 - Other elevated white 

blood cell count   





(5) Acute acalculous cholecystitis


Code(s): K81.0 - ACUTE CHOLECYSTITIS   Status: Suspected   





- Plan


Plan: 


58 yo Male presents with RUQ abdominal pain admitted for acalculous 

cholecystitis and elevated troponins:





#Suspected Acute Acalculous Cholecystitis


-WBC count WNL. Afebrile. DC zosyn on 9/6/19


-Dr. Bryant with general surgery has been consulted, Lap Sun performed on 9/5/ 19, appreciate recs


-US showed thickened GB wall, HIDA abnormal 


-ALT/AST remain elevated this morning but slightly trending down, Surgery is 

considering ERCP today


-GI Dr. Pittman consulted, do not plan ERCP at this point, recommend to repeat 

LFTs until discharge and then check again at 2 weeks post d/c, have signed off 

case as of 9/6/19





#Elevated troponin, downtrended


Pt with h/o 5v CABG


-Repeat EKG and trops for any chest pain, currently asymptomatic





#CAD s/p CABG and stents


-Consult Cardio: Continue medications, resume aspirin, statin, and plavix per 

surgery recs; signed off as of 9/6/19





#DM2


-continue home meds


-Accuchecks q6h


-SSI





#Paroxysmal a-fib


-continue home meds





#CHF


-Daily weights


-Strict I/O's


-Continue home meds





Code status: FULL


VTE PPx: SCDs


Diet: Consistent Carb (1800 nick)





Dispo: s/p lap sun day #2, await AM LFTs due to concern for further CBD stone

, discharge per surgery recs.





Addendum - Attending





- Attending Attestation


Date/Time: 09/07/19 5653





I personally evaluated the patient and discussed the management with Dr. Duvall.


I agree with the History, Examination, Assessment and Plan documented above 

with any addition or exceptions noted below.


Pt's abdominal pain is improved.  He has tolerated his diet.  Cleared by 

surgery for discharge.

## 2019-09-07 NOTE — EKG
Test Reason : 

Blood Pressure : ***/*** mmHG

Vent. Rate : 076 BPM     Atrial Rate : 076 BPM

   P-R Int : 138 ms          QRS Dur : 100 ms

    QT Int : 404 ms       P-R-T Axes : 033 063 020 degrees

   QTc Int : 454 ms

 

Sinus rhythm with occasional Premature ventricular complexes

Low voltage QRS

Abnormal ECG

 

Confirmed by DUBIN M.D., RICHARD ([id number]),  KAN SILVA ([id number]) on 9/7/2019 10:58:00 PM

 

Referred By:             Confirmed By:RICHARD DUBIN M.D.

## 2019-09-09 NOTE — DIS
DATE OF ADMISSION:  09/02/2019



DATE OF DISCHARGE:  09/07/2019



ADMITTING ATTENDING:  Damir Wilson MD.



DISCHARGE ATTENDING:  Celeste Gabriel MD.



CONSULTS:  

1. Cardiology, Dr. Velázquez.

2. GI, Dr. Camilo.

3. General Surgery, Dr. Bryant.

4. Walking Program.



PROCEDURES:  

1. Abdominal ultrasound on September 2, 2019.  Results:  Edematous gallbladder wall

without gallstones. 

2. HIDA scan on September 2, 2019.  Results:  There is tracer extraction by the

liver without excretion into the biliary tract or small bowel loops or filling of

the gallbladder at 4 hours post injection.  Since the recent gallbladder ultrasound

demonstrates no abnormal biliary ductal dilation, this finding is most likely due to

hepatocellular dysfunction.  As there is no biliary excretion, the possibility of

cholecystitis cannot be excluded on the study. 

3. Abdominal ultrasound on September 5, 2019.  Results:  Diffuse increased hepatic

echotexture and hepatomegaly suggesting hepatocellular dysfunction, which maybe from

steatosis or hepatitis.  Circumferential wall thickening of a nondistended

gallbladder suggesting third spacing and congestive changes from hepatocellular

dysfunction.  Nonobstructing calculus inferior pole of the left kidney.  Bilateral

renal cysts.  Adequate velocity and normal directional flow within the hepatic

artery, hepatic veins, and portal vein. 

4. Cholecystectomy by Dr. Bryant on September 5, 2019.



PRIMARY DIAGNOSIS:  Acute acalculous cholecystitis.



SECONDARY DIAGNOSES:  

1. Elevated troponin.

2. Coronary artery disease, status post coronary artery bypass graft and stent.

3. Diabetes mellitus type 2.

4. Paroxysmal atrial fibrillation.

5. Congestive heart failure.



DISCHARGE MEDICATIONS:  

1. Sotalol hydrochloride (Betapace) 40 mg p.o. b.i.d.

2. Liraglutide (Victoza) 1.8 mg subcu daily.

3. Carvedilol 25 mg one tab p.o. b.i.d.

4. Insulin glargine (Lantus) 64 units subcu q.p.m.

5. Metformin 1000 mg tabs one tab p.o. b.i.d.

6. Clopidogrel 75 mg one tab p.o. daily.

7. Atorvastatin 40 mg p.o. q.p.m.

8. Oxycodone Myristate (XTAMPZA ER) 18 mg capsules one cap full p.o. q.12 hours.

9. Icosapent ethyl (Vascepa) 1 g capsule two capsules p.o. b.i.d.

10. Entresto 49 mg-51 mg tablet one tab p.o. b.i.d.

11. Digoxin 0.125 mg p.o. daily.

12. Ibuprofen 800 mg p.o. q.8 hours.

13. Ivabradine (Corlanor) 2.5 mg p.o. b.i.d.



DISCONTINUED MEDICATIONS:  

1. Digoxin 250 mcg tablet 0.5 tablets p.o. daily.

2. Glipizide 10 mg tab one tablet p.o. b.i.d.

3. Ivabradine (Corlanor) 5 mg tabs 0.5 tab p.o. b.i.d.



HISTORY OF PRESENT ILLNESS/HOSPITAL COURSE:  The patient is a 59-year-old male with

significant past medical historyOf CAD with CABG, diabetes mellitus type 2, atrial

fibrillation, and CHF, who presents to the [facility] ED complaining of right

upper quadrant pain.  The patient states that the pain started at the end of last

week and has been progressively worsening.  The patient noticed that the pain is

worse after meals, so he was not eating much the last couple days.  This improved

his pain until today when it acutely worsened after eating a greasy gas station

food.  The patient denies any associated symptoms including chest pain, nausea,

vomiting, diarrhea, and constipation.  Initial workup in the ED showed the patient

to have thickening and enlargement of his gallbladder with a white blood cell count

of 13.9 and indeterminate level troponin.  The patient denied any chest pain or

shortness of breath.  The patient was admitted to inpatient status on the medical

floor for further workup of suspected acute acalculous cholecystitis.  Surgery was

consulted at this point, and Dr. Bryant recommended a HIDA scan.  The ED had started

Zosyn which was continued on the floor.  A repeat EKG was ordered and troponins were

trended. 



On the morning of September 3, 2019, the patient was resting comfortably and

reported that his abdominal pain and nausea had improved.  Cardiology was consulted

at this time for surgical clearance in case the patient needed a cholecystectomy

performed. 



On the morning of September 4, 2019, the patient's LFT pattern suggested a biliary

obstruction.  However, the HIDA scan from September 3rd had suggested hepatic

dysfunction.  At this point, GI was consulted to further evaluate the patient.  That

afternoon, Dr. Pittman with GI saw the patient and recommended repeating his LFTs and

liver ultrasound with Dopplers to evaluate hepatic flow and assessment of the IVC.

He would also recheck the common bile duct for any signs of obstruction, but at that

point thought the patient showed no evidence of acute cholangitis. 



Dr. Velázquez with Cardiology then saw the patient and determined that he was a

reasonable candidate to proceed with a laparoscopic cholecystectomy.  She continued

his current medications.  On the morning of September 5, 2019, Dr. Bryant performed a

laparoscopic cholecystectomy with an intraoperative cholangiogram.  Pathology of the

gallbladder showed acute and chronic cholecystitis along with focal areas of

ulceration with necrosis.  The patient otherwise tolerated the surgery well.  His

clinical condition continued to improve over the next 2 days and overall his

abdominal pain and nausea continued to resolve.  His cardiac function also remained

stable.  On the morning of September 7, 2019, the patient had tolerated advancement

of his diet.  He did not complain of any abdominal pain.  He was also able to pass

gas and have a bowel movement.  At this point, he was cleared by Dr. Bryant and the

surgical team for discharge home.  On the afternoon of September 7, 2019, the

patient was discharged home with planned close followup with PCP at [facility] within seven days and with Surgery, Dr. Bryant, in 10 days. 



PERTINENT LABS:  Labs on September 2, 2019, admission labs.  BMP; sodium 137,

potassium 4.5, chloride 98, carbon dioxide 29, BUN 10, creatinine 0.88, glucose 135.

 AST 11, ALT 14, alkaline phosphatase 73.  Troponin 0.158, 0.074.  CBC; WBC 13.9,

hemoglobin 12.8, hematocrit 38.3, platelets 275. 



Labs on September 4, 2019.  CMP; sodium 137, potassium 3.5, chloride 101, carbon

dioxide 21, BUN 17, creatinine 0.95, glucose 176.  Total bilirubin 3.7, direct

bilirubin 2.8, , , alkaline phosphatase 370.  CBC; WBC 10.0,

hemoglobin 11.3, hematocrit 33.6, platelets 223. 



Labs on September 6, 2019.  CMP; sodium 136, potassium 3.7, chloride 100, carbon

dioxide 26, BUN 9, creatinine 0.98, glucose 254.  Total bilirubin 2.0, direct

bilirubin 1.6, AST 51, ALT 97, alkaline phosphatase 356. 



Labs on September 7, 2019, discharge labs.  BMP; sodium 134, potassium 3.5, chloride

97, carbon dioxide 27, BUN 7, creatinine 0.86, glucose 265, calcium 8.2, phosphorus

2.1, magnesium 2.2.  Total bilirubin 1.4, direct bilirubin 1.1, AST 29, ALT 74,

alkaline phosphatase 332. 



DISPOSITION:  Stable.



DISCHARGE INSTRUCTIONS:  

1. Location:  Home.

2. Diet:  Regular, advance as tolerated.

3. Activity:  As tolerated.

4. Follow up with [facility] in 7 days.

5. Follow up with Dr. Vasiliy Bryant in 10 days.







Job ID:  [id number]

## 2019-09-10 NOTE — DIS
DATE OF ADMISSION:  09/02/2019



DATE OF DISCHARGE:  09/07/2019



ADMITTING ATTENDING:  Damir Wilson MD



CONSULTS:  

1. Gastroenterology, Geovani Pittman MD.

2. Cardiology, Bhavana Velázquez MD.

3. General Surgery, Vasiliy Bryant DO.



PROCEDURE PERFORMED:  Laparoscopic cholecystectomy.



IMAGING:  Abdominal ultrasound revealed thickened gallbladder wall. 



HIDA scan revealed possible hepatocellular dysfunction and incomplete filling of the

gallbladder. 



Echocardiogram revealed a left ventricular ejection fraction visually estimated 30%

to 35% with diastolic dysfunction as well. 



DISCHARGE MEDICATIONS:  

1. Sotalol 40 mg p.o. b.i.d.

2. Victoza 1.8 mg subcutaneous daily.

3. Carvedilol 25 mg p.o. b.i.d.

4. Lantus 64 units subcutaneous q.p.m.

5. Metformin 1 tablet p.o. b.i.d.

6. Clopidogrel 75 mg p.o. daily.

7. Atorvastatin 40 mg p.o. q.p.m.

8. Oxycodone myristate 18 mg p.o. q.12 hours.

9. Vascepa 2 caps p.o. b.i.d.

10. Entresto one tab p.o. b.i.d.

11. Digoxin 0.125 mg p.o. daily.

12. Ibuprofen 800 mg p.o. q.8 hours.

13. Corlanor 2.5 mg p.o. b.i.d.



DISCONTINUED MEDICATIONS:  

1. Digoxin 0.5 tabs p.o. daily.

2. Corlanor 0.5 tab p.o. b.i.d.

3. Glipizide 1 tab p.o. b.i.d.



DISCHARGE DIAGNOSIS:  Acute acalculous cholecystitis with transaminitis.



SECONDARY DIAGNOSES:  

1. Coronary artery disease.

2. Diabetes mellitus type 2.

3. Paroxysmal atrial fibrillation.

4. Congestive heart failure.



HISTORY OF PRESENT ILLNESS/HOSPITAL COURSE:  Mr. Mcmullen is a 59-year-old male, who

initially presented to the emergency department with right upper quadrant pain

starting at the end of last week and progressively worsened, decreased p.o. intake

as the food was worse after consuming meals.  No associated symptoms present at that

time.  Ultrasound in the emergency department showed a thickening of the gallbladder

wall, so the patient was admitted for further evaluation for gallbladder disease.

HIDA obtained from the ER was abnormal.  Surgery was consulted.  Liver enzymes

bumped, so GI was consulted.  Intraoperative cholangiogram did not reveal any stones

in the common bile duct.  The patient was continued to be monitored after his

laparoscopic cholecystectomy, which he underwent without any complications.

Cardiology was consulted for risk stratification for patient's cardiac disease.  The

patient continued to remain stable after his laparoscopic cholecystectomy and was

discharged home in stable conditions. 



DISCHARGE INSTRUCTIONS:  

1. Location:  Home.

2. Diet:  Heart healthy, diabetic.

3. Activity:  As tolerated.

4. Followup:  Followup with PCP in the next 3 to 7 days and Surgery, Dr. Vasiliy Bryant, in the next 2 weeks. 





Job ID:  [id number]

## 2020-04-14 ENCOUNTER — HOSPITAL ENCOUNTER (INPATIENT)
Dept: HOSPITAL 92 - ERS | Age: 60
LOS: 1 days | Discharge: HOME | DRG: 309 | End: 2020-04-15
Attending: FAMILY MEDICINE | Admitting: FAMILY MEDICINE
Payer: MEDICARE

## 2020-04-14 VITALS — BODY MASS INDEX: 33.6 KG/M2

## 2020-04-14 DIAGNOSIS — I48.0: Primary | ICD-10-CM

## 2020-04-14 DIAGNOSIS — E83.42: ICD-10-CM

## 2020-04-14 DIAGNOSIS — Z95.810: ICD-10-CM

## 2020-04-14 DIAGNOSIS — Z79.01: ICD-10-CM

## 2020-04-14 DIAGNOSIS — Z95.1: ICD-10-CM

## 2020-04-14 DIAGNOSIS — I25.2: ICD-10-CM

## 2020-04-14 DIAGNOSIS — E11.9: ICD-10-CM

## 2020-04-14 DIAGNOSIS — Z79.899: ICD-10-CM

## 2020-04-14 DIAGNOSIS — I11.0: ICD-10-CM

## 2020-04-14 DIAGNOSIS — E78.5: ICD-10-CM

## 2020-04-14 DIAGNOSIS — Z87.891: ICD-10-CM

## 2020-04-14 DIAGNOSIS — I25.709: ICD-10-CM

## 2020-04-14 DIAGNOSIS — Z79.4: ICD-10-CM

## 2020-04-14 DIAGNOSIS — Z88.0: ICD-10-CM

## 2020-04-14 DIAGNOSIS — I50.22: ICD-10-CM

## 2020-04-14 DIAGNOSIS — I48.92: ICD-10-CM

## 2020-04-14 LAB
ALBUMIN SERPL BCG-MCNC: 3.7 G/DL (ref 3.5–5)
ALP SERPL-CCNC: 62 U/L (ref 40–110)
ALT SERPL W P-5'-P-CCNC: 13 U/L (ref 8–55)
ANION GAP SERPL CALC-SCNC: 16 MMOL/L (ref 10–20)
AST SERPL-CCNC: 11 U/L (ref 5–34)
BASOPHILS # BLD AUTO: 0 THOU/UL (ref 0–0.2)
BASOPHILS NFR BLD AUTO: 0.2 % (ref 0–1)
BILIRUB SERPL-MCNC: 0.5 MG/DL (ref 0.2–1.2)
BUN SERPL-MCNC: 10 MG/DL (ref 8.4–25.7)
CALCIUM SERPL-MCNC: 8.7 MG/DL (ref 7.8–10.44)
CHLORIDE SERPL-SCNC: 99 MMOL/L (ref 98–107)
CO2 SERPL-SCNC: 21 MMOL/L (ref 22–29)
CREAT CL PREDICTED SERPL C-G-VRATE: 0 ML/MIN (ref 70–130)
DIGOXIN SERPL-MCNC: 0.24 NG/ML (ref 0.8–2)
EOSINOPHIL # BLD AUTO: 0.3 THOU/UL (ref 0–0.7)
EOSINOPHIL NFR BLD AUTO: 2.8 % (ref 0–10)
GLOBULIN SER CALC-MCNC: 3.2 G/DL (ref 2.4–3.5)
GLUCOSE SERPL-MCNC: 251 MG/DL (ref 70–105)
HGB BLD-MCNC: 13.4 G/DL (ref 14–18)
LYMPHOCYTES # BLD: 2.3 THOU/UL (ref 1.2–3.4)
LYMPHOCYTES NFR BLD AUTO: 23.5 % (ref 21–51)
MAGNESIUM SERPL-MCNC: 1.7 MG/DL (ref 1.6–2.6)
MCH RBC QN AUTO: 29.5 PG (ref 27–31)
MCV RBC AUTO: 88.7 FL (ref 78–98)
MONOCYTES # BLD AUTO: 0.7 THOU/UL (ref 0.11–0.59)
MONOCYTES NFR BLD AUTO: 6.8 % (ref 0–10)
NEUTROPHILS # BLD AUTO: 6.4 THOU/UL (ref 1.4–6.5)
NEUTROPHILS NFR BLD AUTO: 66.7 % (ref 42–75)
PLATELET # BLD AUTO: 278 THOU/UL (ref 130–400)
POTASSIUM SERPL-SCNC: 4.2 MMOL/L (ref 3.5–5.1)
RBC # BLD AUTO: 4.56 MILL/UL (ref 4.7–6.1)
SODIUM SERPL-SCNC: 132 MMOL/L (ref 136–145)
WBC # BLD AUTO: 9.7 THOU/UL (ref 4.8–10.8)

## 2020-04-14 PROCEDURE — 36416 COLLJ CAPILLARY BLOOD SPEC: CPT

## 2020-04-14 PROCEDURE — 71045 X-RAY EXAM CHEST 1 VIEW: CPT

## 2020-04-14 PROCEDURE — 85025 COMPLETE CBC W/AUTO DIFF WBC: CPT

## 2020-04-14 PROCEDURE — 83036 HEMOGLOBIN GLYCOSYLATED A1C: CPT

## 2020-04-14 PROCEDURE — 83880 ASSAY OF NATRIURETIC PEPTIDE: CPT

## 2020-04-14 PROCEDURE — 93005 ELECTROCARDIOGRAM TRACING: CPT

## 2020-04-14 PROCEDURE — 36415 COLL VENOUS BLD VENIPUNCTURE: CPT

## 2020-04-14 PROCEDURE — 80162 ASSAY OF DIGOXIN TOTAL: CPT

## 2020-04-14 PROCEDURE — 83735 ASSAY OF MAGNESIUM: CPT

## 2020-04-14 PROCEDURE — 80053 COMPREHEN METABOLIC PANEL: CPT

## 2020-04-14 PROCEDURE — 84484 ASSAY OF TROPONIN QUANT: CPT

## 2020-04-14 RX ADMIN — Medication SCH ML: at 11:26

## 2020-04-14 RX ADMIN — HYDROCODONE BITARTRATE AND ACETAMINOPHEN PRN TAB: 5; 325 TABLET ORAL at 21:10

## 2020-04-14 RX ADMIN — INSULIN LISPRO PRN UNIT: 100 INJECTION, SOLUTION INTRAVENOUS; SUBCUTANEOUS at 18:51

## 2020-04-14 RX ADMIN — HYDROCODONE BITARTRATE AND ACETAMINOPHEN PRN TAB: 5; 325 TABLET ORAL at 16:31

## 2020-04-14 RX ADMIN — INSULIN LISPRO PRN UNIT: 100 INJECTION, SOLUTION INTRAVENOUS; SUBCUTANEOUS at 12:43

## 2020-04-14 RX ADMIN — SACUBITRIL AND VALSARTAN SCH TAB: 49; 51 TABLET, FILM COATED ORAL at 21:03

## 2020-04-14 NOTE — PDOC.FPRHP
- History of Present Illness


Chief Complaint: Rapid heart rate


History of Present Illness: 





61 yo M w/hx of pAfib, HFrEF, CAD s/p CABG and DM2 here with complaint of rapid 

heart rate that woke him from his sleep this morning. He states that at about 

0345 a feeling of palpitations woke him from his sleep. He denies associated CP

, SOB, dizziness, n/v, or diaphoresis. No neuro symptom such as weakness or 

numbness. He notes that this felt like a previous episode of RVR. He denies any 

recent changes in meds. Upon arrival to the ER and EKG was c/w afib w/RVR vs a 

flutter. He was given 1 dose of dig 0.25mg with resolution of tachycardia. At 

the time of this H&P he denies all symptoms.  


ED Course: 





as above





- Allergies/Adverse Reactions


 Allergies











Allergy/AdvReac Type Severity Reaction Status Date / Time


 


Penicillins Allergy Intermediate Rash Verified 03/23/20 04:57














- Home Medications


 











 Medication  Instructions  Recorded  Confirmed  Type


 


Atorvastatin Calcium [Lipitor] 40 mg PO QPM 10/26/16 04/14/20 History


 


Carvedilol 1 tab PO BID 10/26/16 04/14/20 History


 


Clopidogrel Bisulfate [Clopidogrel] 1 tab PO DAILY 10/26/16 04/14/20 History


 


Insulin Glargine,Hum.Rec.Anlog 64 unit SC QPM 10/26/16 04/14/20 History





[Lantus Solostar]    


 


Liraglutide [Victoza 3-Ishmael] 1.8 mg SC DAILY 10/26/16 04/14/20 History


 


Sotalol HCl [Betapace] 40 mg PO BID 10/26/16 04/14/20 History


 


metFORMIN HCl 1 tab PO BID 10/26/16 04/14/20 History


 


Icosapent Ethyl [Vascepa] 2 cap PO BID 09/02/19 04/14/20 History


 


Sacubitril/Valsartan 49/51 1 tab PO BID 09/02/19 04/14/20 History





[Entresto 49 mg-51 mg Tablet]    


 


Digoxin [Lanoxin] 0.125 mg PO DAILY #30 tab 09/07/19 04/14/20 Rx


 


Ivabradine [Corlanor] 2.5 mg PO BID #60 tab 09/07/19 04/14/20 Rx


 


Pioglitazone HCl [Actos] 15 mg PO DAILY 04/14/20 04/14/20 History














- History


PMHx:


CAD


DM2


Paroxysmal afib


HLD


Hypomagnesemia 


HTN


HFrEF


 


PSHx: 


CABG


CA stent


AICD placement





 


Social:


Past 20pack year hx, quit over 20 years ago


Social etoh


no drugs


 








- Review of Systems


General: denies: fever/chills, weight/appetite/sleep changes


Eyes: denies: vision changes


Respiratory: denies: cough, congestion, shortness of breath, exercise 

intolerance


Cardiovascular: reports: palpitation.  denies: chest pain, edema, paroxysmal 

nocturnal dyspnea


Gastrointestinal: denies: nausea, vomiting


Genitourinary: denies: dysuria, polyuria


Skin: denies: rashes


Musculoskeletal: denies: pain, tenderness, stiffness


Neurological: denies: numbness, syncope, weakness


Psychological: denies: anxiety, depression





- Vital signs


BP: 104/79, Pulse: 143, Resp: 16, Temp: 97.9 (Oral), O2 sat: 98 on (Room Air), 

Time: 4/14/2020 06:02.








- Physical Exam


Constitutional: NAD, awake, alert and oriented


HEENT: normocephalic and atraumatic, EOMI


Neck: trachea midline


Chest: no-tender to palpation, no lesions


Heart: normal S1/S2, no murmurs/rubs/gallops


-Heart: 





irregularly irregular


Lungs: CTAB, no respiratory distress, good air movement


Abdomen: non-tender


Musculoskeletal: normal structure, ROM grossly normal


Neurological: CN II-XII intact


Skin: no rash/lesions


Heme/Lymphatic: no unusual bruising or bleeding


Psychiatric: normal mood and affect, good judgment and insight





FMR H&P: Results





- Labs


Result Diagrams: 


 04/14/20 06:26





 04/14/20 06:26


Lab results: 


 











WBC  9.7 thou/uL (4.8-10.8)   04/14/20  06:26    


 


Hgb  13.4 g/dL (14.0-18.0)  L  04/14/20  06:26    


 


Hct  40.5 % (42.0-52.0)  L  04/14/20  06:26    


 


MCV  88.7 fL (78.0-98.0)   04/14/20  06:26    


 


Plt Count  278 thou/uL (130-400)   04/14/20  06:26    


 


Neutrophils %  66.7 % (42.0-75.0)   04/14/20  06:26    


 


Sodium  132 mmol/L (136-145)  L  04/14/20  06:26    


 


Potassium  4.2 mmol/L (3.5-5.1)   04/14/20  06:26    


 


Chloride  99 mmol/L ()   04/14/20  06:26    


 


Carbon Dioxide  21 mmol/L (22-29)  L  04/14/20  06:26    


 


BUN  10 mg/dL (8.4-25.7)   04/14/20  06:26    


 


Creatinine  0.83 mg/dL (0.7-1.3)   04/14/20  06:26    


 


Glucose  251 mg/dL ()  H  04/14/20  06:26    


 


Calcium  8.7 mg/dL (7.8-10.44)   04/14/20  06:26    


 


Total Bilirubin  0.5 mg/dL (0.2-1.2)   04/14/20  06:26    


 


AST  11 U/L (5-34)   04/14/20  06:26    


 


ALT  13 U/L (8-55)   04/14/20  06:26    


 


Alkaline Phosphatase  62 U/L ()   04/14/20  06:26    


 


B-Natriuretic Peptide  56.6 pg/mL (0-100)   04/14/20  06:26    


 


Serum Total Protein  6.9 g/dL (6.0-8.3)   04/14/20  06:26    


 


Albumin  3.7 g/dL (3.5-5.0)   04/14/20  06:26    








 Laboratory Tests











  04/14/20





  06:26


 


Digoxin  0.24 L














- EKG Interpretation


EKG: 





Afib, rate 149. No ST or Twave changes





FMR H&P: A/P





- Problem List


(1) Atrial fibrillation with RVR


Current Visit: Yes   Status: Acute   Code(s): I48.91 - UNSPECIFIED ATRIAL 

FIBRILLATION   





(2) DM2 (diabetes mellitus, type 2)


Current Visit: Yes   Status: Chronic   





(3) HTN (hypertension)


Current Visit: Yes   Status: Chronic   Code(s): I10 - ESSENTIAL (PRIMARY) 

HYPERTENSION   





(4) HLD (hyperlipidemia)


Current Visit: Yes   Status: Chronic   Code(s): E78.5 - HYPERLIPIDEMIA, 

UNSPECIFIED   





(5) Hypomagnesemia


Current Visit: Yes   Status: Chronic   Code(s): E83.42 - HYPOMAGNESEMIA   





(6) HFrEF (heart failure with reduced ejection fraction)


Current Visit: Yes   Status: Chronic   Code(s): I50.20 - UNSPECIFIED SYSTOLIC (

CONGESTIVE) HEART FAILURE   





(7) Coronary artery disease


Current Visit: No   Status: Chronic   Code(s): I25.10 - ATHSCL HEART DISEASE OF 

NATIVE CORONARY ARTERY W/O ANG PCTRS   


Qualifiers: 


   Coronary Disease-Associated Artery/Lesion type: bypass graft   Native vs. 

transplanted heart: native heart   Associated angina: with unspecified angina   

Qualified Code(s): I25.709 - Atherosclerosis of coronary artery bypass graft(s)

, unspecified, with unspecified angina pectoris   





- Plan





1. Afib with RVR


- currently rate controlled. Admit to tele for continued motor


- consider CCB or BB if rate worsens


- unclear precipitating cause. No concern for infection or MI at this time 


- due to paroxysmal nature, plant to start DOAC.


- due to infrequent episodes, will not increase dig at this time





2. Hypo Mg


- replace IV, recheck in am





3. CAD


- trop neg, restart home meds





4. DM2


- home meds


- accucheck achs


- current A1c 9.2


- low carb diet 





5. HTN


- restart home meds





PPx - DOAC


Diet low carb, HH


Code Full


Disposition/LOS: 





Patient in good condition. Would expect 2-3 days of hospitalization with 

discharge home. 





Addendum - Attending





- Attending Attestation


Date/Time: 04/14/20 3750





I personally evaluated the patient and discussed the management with Dr. Granados. 


I agree with the History, Examination, Assessment and Plan documented above 

with any addition or exceptions noted below.





Patient with chronic Afib here with RVR. Now in NSR. S/P Digoxin bolus. Will 

monitor on telemetry and see if recurs. No known trigger at this time. Further 

mgmt pending clinical course. Continue home meds for chronic conditions.

## 2020-04-14 NOTE — RAD
PORTABLE CHEST:

 

Date:  04/14/2020

 

HISTORY:  

Tachycardia. 

 

COMPARISON:  

10/06/2014. 

 

FINDINGS:

Mild cardiomegaly. A single AICD lead is again noted. Postop sternotomy change. 

 

The lungs appear clear. No infiltrate, vascular congestion, or effusion. 

 

IMPRESSION: 

No acute process identified. 

 

 

POS: [facility]

## 2020-04-15 VITALS — DIASTOLIC BLOOD PRESSURE: 68 MMHG | TEMPERATURE: 97.7 F | SYSTOLIC BLOOD PRESSURE: 140 MMHG

## 2020-04-15 LAB
ALBUMIN SERPL BCG-MCNC: 3.5 G/DL (ref 3.5–5)
ALP SERPL-CCNC: 48 U/L (ref 40–110)
ALT SERPL W P-5'-P-CCNC: 11 U/L (ref 8–55)
ANION GAP SERPL CALC-SCNC: 13 MMOL/L (ref 10–20)
AST SERPL-CCNC: 12 U/L (ref 5–34)
BILIRUB SERPL-MCNC: 0.3 MG/DL (ref 0.2–1.2)
BUN SERPL-MCNC: 11 MG/DL (ref 8.4–25.7)
CALCIUM SERPL-MCNC: 8.5 MG/DL (ref 7.8–10.44)
CHLORIDE SERPL-SCNC: 105 MMOL/L (ref 98–107)
CO2 SERPL-SCNC: 24 MMOL/L (ref 22–29)
CREAT CL PREDICTED SERPL C-G-VRATE: 154 ML/MIN (ref 70–130)
GLOBULIN SER CALC-MCNC: 2.9 G/DL (ref 2.4–3.5)
GLUCOSE SERPL-MCNC: 211 MG/DL (ref 70–105)
MAGNESIUM SERPL-MCNC: 1.9 MG/DL (ref 1.6–2.6)
POTASSIUM SERPL-SCNC: 4.1 MMOL/L (ref 3.5–5.1)
SODIUM SERPL-SCNC: 138 MMOL/L (ref 136–145)

## 2020-04-15 RX ADMIN — SACUBITRIL AND VALSARTAN SCH TAB: 49; 51 TABLET, FILM COATED ORAL at 08:53

## 2020-04-15 RX ADMIN — INSULIN LISPRO PRN UNIT: 100 INJECTION, SOLUTION INTRAVENOUS; SUBCUTANEOUS at 08:49

## 2020-04-15 RX ADMIN — Medication SCH ML: at 09:00

## 2020-04-15 RX ADMIN — HYDROCODONE BITARTRATE AND ACETAMINOPHEN PRN TAB: 5; 325 TABLET ORAL at 08:54

## 2020-04-15 RX ADMIN — HYDROCODONE BITARTRATE AND ACETAMINOPHEN PRN TAB: 5; 325 TABLET ORAL at 02:55

## 2020-04-15 RX ADMIN — Medication SCH ML: at 00:00

## 2020-04-15 RX ADMIN — INSULIN LISPRO PRN UNIT: 100 INJECTION, SOLUTION INTRAVENOUS; SUBCUTANEOUS at 12:06

## 2020-04-15 NOTE — PDOC.FM
- Subjective


Subjective: 





Mr. Mcmullen reports feeling well this am. He has no complaints. Has been in 

NSR overnight. Says he is ready to go home.








- Objective


Vital Signs & Weight: 


 Vital Signs (12 hours)











  Temp Pulse Resp BP Pulse Ox


 


 04/15/20 03:37  97.8 F  76  18  120/58 L  96


 


 04/14/20 23:35   82   122/58 L 


 


 04/14/20 21:03   81   


 


 04/14/20 19:45  97.7 F  81  18  126/64  96








 Weight











Weight                         112.4 kg














I&O: 


 











 04/14/20 04/15/20 04/16/20





 06:59 06:59 06:59


 


Intake Total  960 


 


Output Total  800 


 


Balance  160 











Result Diagrams: 


 04/14/20 06:26





 04/15/20 04:48





Phys Exam





- Physical Examination


Constitutional: NAD


Respiratory: clear to auscultation bilateral


Cardiovascular: RRR, no significant murmur


Gastrointestinal: soft, non-tender


Neurological: non-focal


Psychiatric: normal affect


Skin: normal turgor





Dx/Plan





- Plan


Plan: 





1. Afib with RVR


- NSR overnight


- unclear precipitating cause. No concern for infection or MI at this time 


- due to paroxysmal nature, DOAC started


- due to infrequent episodes, will not increase dig at this time





2. Hypo Mg


- replace IV, recheck in am





3. CAD


- trop neg, continue home meds





4. DM2


- home meds


- accucheck achs


- current A1c 9.2, patient to discuss further insulin adjustments with PCP. 

Patient note this is improved compared to prior.


- low carb diet 





5. HTN


- Continue home meds





PPx - DOAC


Diet low carb, HH


Code Full


Disposition/LOS: Plan for discharge today.








Addendum - Attending





- Attending Attestation


Date/Time: 04/15/20 1121





I personally evaluated the patient and discussed the management with Dr. Collins. 


I agree with the History, Examination, Assessment and Plan documented above 

with any addition or exceptions noted below.





Patient stable. Has been in NSR since admission. Discharge with further mgmt by 

PCP and cardiology outpatient.

## 2020-04-16 NOTE — DIS
DATE OF ADMISSION:  04/14/2020



DATE OF DISCHARGE:  04/15/2020



RESIDENT:  Mendy Collins DO



ADMITTING ATTENDING:  Damir Wilson MD



DISCHARGE ATTENDING:  Damir Wilson MD.



CONSULTS:  None.



PROCEDURES PERFORMED:  None.



PRIMARY DIAGNOSES:  

1. Atrial fibrillation with rapid ventricular rate, resolved.

2. Hypomagnesemia.



SECONDARY DIAGNOSES:  

1. Coronary artery disease.

2. Type 2 diabetes.

3. Hypertension.



DISCHARGE MEDICATIONS:  

1. Sotalol 40 mg p.o. b.i.d.

2. Liraglutide 1.8 mg subcu daily.

3. Carvedilol 25 mg one tab p.o. b.i.d.

4. Lantus 64 units subcu q.p.m.

5. Metformin 1000 mg p.o. b.i.d.

6. Clopidogrel 75 mg one tab p.o. daily.

7. Lipitor 40 mg p.o. q.p.m.

8. Vascepa two capsules p.o. b.i.d.

9. Entresto one tab p.o. b.i.d.

10. Digoxin 0.125 mg p.o. daily.

11. Corlanor 2.5 mg p.o. b.i.d.

12. Pioglitazone 15 mg p.o. daily.

13. Oxycodone 18 mg p.o. b.i.d.

14. Insulin aspart 8 units subcu a.c.

15. Eliquis 5 mg p.o. b.i.d.



DISCONTINUE MEDICATIONS:  None.



HISTORY OF PRESENT ILLNESS:  A 60-year-old male with past medical history of

paroxysmal atrial fibrillation, heart failure with reduced ejection fraction,

coronary artery disease status post coronary artery bypass grafting, type 2

insulin-dependent diabetes, presented with rapid heart rate that woke him from

sleep.  Upon arrival to the emergency department, EKG was consistent with atrial

fibrillation with RVR versus atrial flutter.  He was given one dose of digoxin 0.25

mg, which resolved tachycardia.  He was converted to normal sinus rhythm and was

consistently in normal sinus rhythm during entire duration on telemetry floor.  Due

to the paroxysmal nature, the patient was started on Eliquis.  Other rate control

medications were not adjusted.  His hypomagnesemia was replaced and resolved.  A1c

was 9.2, which patient reports improved compared to prior.  The patient to follow up

with PCP for further diabetes management.  All home medications were continued.  The

patient to follow up with cardiologist within 1 week. 



DISPOSITION:  Stable.



DISCHARGE INSTRUCTIONS:  

1. Location:  Home.

2. Diet:  Heart healthy and carb consistent.

3. Activity:  As tolerated.

4. Followup:  Follow up with primary care physician at [facility], Dr. Granados within 1 week.  Followup with cardiologist, Dr. Banks, in 1 week. 







Job ID:  [id number]

## 2020-04-18 NOTE — EKG
Test Reason : 

Blood Pressure : ***/*** mmHG

Vent. Rate : 149 BPM     Atrial Rate : 149 BPM

   P-R Int : 112 ms          QRS Dur : 090 ms

    QT Int : 354 ms       P-R-T Axes : 000 045 -17 degrees

   QTc Int : 557 ms

 

Atrial flutter with 2 to 1 block

Low voltage QRS

Inferior infarct , age undetermined

Anterolateral infarct , age undetermined

Abnormal ECG

 

Confirmed by FABBY MORALES ([id number]),  YASMINE MACIAS ([id number]) on 4/18/2020 9:21:16 AM

 

Referred By:             Confirmed By:FABBY MORALES

## 2021-05-20 ENCOUNTER — HOSPITAL ENCOUNTER (INPATIENT)
Dept: HOSPITAL 92 - ERS | Age: 61
LOS: 2 days | Discharge: HOME | DRG: 292 | End: 2021-05-22
Attending: FAMILY MEDICINE | Admitting: FAMILY MEDICINE
Payer: MEDICARE

## 2021-05-20 VITALS — BODY MASS INDEX: 0.1 KG/M2

## 2021-05-20 DIAGNOSIS — I25.10: ICD-10-CM

## 2021-05-20 DIAGNOSIS — Z98.890: ICD-10-CM

## 2021-05-20 DIAGNOSIS — Z87.891: ICD-10-CM

## 2021-05-20 DIAGNOSIS — E78.5: ICD-10-CM

## 2021-05-20 DIAGNOSIS — J20.9: ICD-10-CM

## 2021-05-20 DIAGNOSIS — Z95.1: ICD-10-CM

## 2021-05-20 DIAGNOSIS — Z82.49: ICD-10-CM

## 2021-05-20 DIAGNOSIS — I11.0: Primary | ICD-10-CM

## 2021-05-20 DIAGNOSIS — Z79.4: ICD-10-CM

## 2021-05-20 DIAGNOSIS — I50.23: ICD-10-CM

## 2021-05-20 DIAGNOSIS — Z90.49: ICD-10-CM

## 2021-05-20 DIAGNOSIS — Z88.0: ICD-10-CM

## 2021-05-20 DIAGNOSIS — Z79.02: ICD-10-CM

## 2021-05-20 DIAGNOSIS — Z20.822: ICD-10-CM

## 2021-05-20 DIAGNOSIS — E11.9: ICD-10-CM

## 2021-05-20 DIAGNOSIS — I48.20: ICD-10-CM

## 2021-05-20 LAB
ALBUMIN SERPL BCG-MCNC: 3.5 G/DL (ref 3.4–4.8)
ALP SERPL-CCNC: 111 U/L (ref 40–110)
ALT SERPL W P-5'-P-CCNC: 23 U/L (ref 8–55)
ANION GAP SERPL CALC-SCNC: 16 MMOL/L (ref 10–20)
AST SERPL-CCNC: 23 U/L (ref 5–34)
BASOPHILS # BLD AUTO: 0 THOU/UL (ref 0–0.2)
BASOPHILS NFR BLD AUTO: 0.2 % (ref 0–1)
BILIRUB SERPL-MCNC: 0.3 MG/DL (ref 0.2–1.2)
BUN SERPL-MCNC: 10 MG/DL (ref 8.4–25.7)
CALCIUM SERPL-MCNC: 8.6 MG/DL (ref 7.8–10.44)
CHLORIDE SERPL-SCNC: 100 MMOL/L (ref 98–107)
CO2 SERPL-SCNC: 24 MMOL/L (ref 23–31)
CREAT CL PREDICTED SERPL C-G-VRATE: 0 ML/MIN (ref 70–130)
EOSINOPHIL # BLD AUTO: 0 THOU/UL (ref 0–0.7)
EOSINOPHIL NFR BLD AUTO: 0.3 % (ref 0–10)
GLOBULIN SER CALC-MCNC: 3.3 G/DL (ref 2.4–3.5)
GLUCOSE SERPL-MCNC: 140 MG/DL (ref 80–115)
HGB BLD-MCNC: 11.2 G/DL (ref 14–18)
LYMPHOCYTES # BLD: 1.6 THOU/UL (ref 1.2–3.4)
LYMPHOCYTES NFR BLD AUTO: 16.6 % (ref 21–51)
MCH RBC QN AUTO: 27.7 PG (ref 27–31)
MCV RBC AUTO: 84.4 FL (ref 78–98)
MONOCYTES # BLD AUTO: 0.9 THOU/UL (ref 0.11–0.59)
MONOCYTES NFR BLD AUTO: 9.7 % (ref 0–10)
NEUTROPHILS # BLD AUTO: 7 THOU/UL (ref 1.4–6.5)
NEUTROPHILS NFR BLD AUTO: 73.2 % (ref 42–75)
PLATELET # BLD AUTO: 304 THOU/UL (ref 130–400)
POTASSIUM SERPL-SCNC: 4 MMOL/L (ref 3.5–5.1)
PROT UR STRIP.AUTO-MCNC: 70 MG/DL
RBC # BLD AUTO: 4.05 MILL/UL (ref 4.7–6.1)
RBC UR QL AUTO: (no result) HPF (ref 0–3)
SODIUM SERPL-SCNC: 136 MMOL/L (ref 136–145)
SP GR UR STRIP: 1.02 (ref 1–1.04)
TROPONIN I SERPL DL<=0.01 NG/ML-MCNC: (no result) NG/ML (ref ?–0.03)
TROPONIN I SERPL DL<=0.01 NG/ML-MCNC: (no result) NG/ML (ref ?–0.03)
WBC # BLD AUTO: 9.5 THOU/UL (ref 4.8–10.8)
WBC UR QL AUTO: (no result) HPF (ref 0–3)

## 2021-05-20 PROCEDURE — 96376 TX/PRO/DX INJ SAME DRUG ADON: CPT

## 2021-05-20 PROCEDURE — G0378 HOSPITAL OBSERVATION PER HR: HCPCS

## 2021-05-20 PROCEDURE — 36416 COLLJ CAPILLARY BLOOD SPEC: CPT

## 2021-05-20 PROCEDURE — 71045 X-RAY EXAM CHEST 1 VIEW: CPT

## 2021-05-20 PROCEDURE — 94640 AIRWAY INHALATION TREATMENT: CPT

## 2021-05-20 PROCEDURE — 93005 ELECTROCARDIOGRAM TRACING: CPT

## 2021-05-20 PROCEDURE — 93306 TTE W/DOPPLER COMPLETE: CPT

## 2021-05-20 PROCEDURE — 80053 COMPREHEN METABOLIC PANEL: CPT

## 2021-05-20 PROCEDURE — 96374 THER/PROPH/DIAG INJ IV PUSH: CPT

## 2021-05-20 PROCEDURE — 85025 COMPLETE CBC W/AUTO DIFF WBC: CPT

## 2021-05-20 PROCEDURE — 96372 THER/PROPH/DIAG INJ SC/IM: CPT

## 2021-05-20 PROCEDURE — 83880 ASSAY OF NATRIURETIC PEPTIDE: CPT

## 2021-05-20 PROCEDURE — 0240U: CPT

## 2021-05-20 PROCEDURE — 81015 MICROSCOPIC EXAM OF URINE: CPT

## 2021-05-20 PROCEDURE — 81003 URINALYSIS AUTO W/O SCOPE: CPT

## 2021-05-20 PROCEDURE — 80048 BASIC METABOLIC PNL TOTAL CA: CPT

## 2021-05-20 PROCEDURE — 36415 COLL VENOUS BLD VENIPUNCTURE: CPT

## 2021-05-20 PROCEDURE — 84484 ASSAY OF TROPONIN QUANT: CPT

## 2021-05-20 RX ADMIN — SACUBITRIL AND VALSARTAN SCH TAB: 49; 51 TABLET, FILM COATED ORAL at 20:26

## 2021-05-20 RX ADMIN — INSULIN GLARGINE SCH UNIT: 100 INJECTION, SOLUTION SUBCUTANEOUS at 20:24

## 2021-05-20 RX ADMIN — INSULIN LISPRO SCH UNIT: 100 INJECTION, SOLUTION INTRAVENOUS; SUBCUTANEOUS at 17:24

## 2021-05-20 RX ADMIN — Medication PRN ML: at 20:26

## 2021-05-21 LAB
ANION GAP SERPL CALC-SCNC: 13 MMOL/L (ref 10–20)
BASOPHILS # BLD AUTO: 0 THOU/UL (ref 0–0.2)
BASOPHILS NFR BLD AUTO: 0.4 % (ref 0–1)
BUN SERPL-MCNC: 11 MG/DL (ref 8.4–25.7)
CALCIUM SERPL-MCNC: 8.5 MG/DL (ref 7.8–10.44)
CHLORIDE SERPL-SCNC: 96 MMOL/L (ref 98–107)
CO2 SERPL-SCNC: 31 MMOL/L (ref 23–31)
CREAT CL PREDICTED SERPL C-G-VRATE: 142 ML/MIN (ref 70–130)
EOSINOPHIL # BLD AUTO: 0.1 THOU/UL (ref 0–0.7)
EOSINOPHIL NFR BLD AUTO: 0.7 % (ref 0–10)
GLUCOSE SERPL-MCNC: 107 MG/DL (ref 80–115)
HGB BLD-MCNC: 11.3 G/DL (ref 14–18)
LYMPHOCYTES # BLD: 1.7 THOU/UL (ref 1.2–3.4)
LYMPHOCYTES NFR BLD AUTO: 22.3 % (ref 21–51)
MCH RBC QN AUTO: 27.3 PG (ref 27–31)
MCV RBC AUTO: 86 FL (ref 78–98)
MONOCYTES # BLD AUTO: 0.9 THOU/UL (ref 0.11–0.59)
MONOCYTES NFR BLD AUTO: 12.4 % (ref 0–10)
NEUTROPHILS # BLD AUTO: 4.9 THOU/UL (ref 1.4–6.5)
NEUTROPHILS NFR BLD AUTO: 64.3 % (ref 42–75)
PLATELET # BLD AUTO: 292 THOU/UL (ref 130–400)
POTASSIUM SERPL-SCNC: 3.6 MMOL/L (ref 3.5–5.1)
RBC # BLD AUTO: 4.12 MILL/UL (ref 4.7–6.1)
SODIUM SERPL-SCNC: 136 MMOL/L (ref 136–145)
WBC # BLD AUTO: 7.6 THOU/UL (ref 4.8–10.8)

## 2021-05-21 RX ADMIN — INSULIN LISPRO SCH: 100 INJECTION, SOLUTION INTRAVENOUS; SUBCUTANEOUS at 13:07

## 2021-05-21 RX ADMIN — SACUBITRIL AND VALSARTAN SCH TAB: 49; 51 TABLET, FILM COATED ORAL at 20:01

## 2021-05-21 RX ADMIN — SACUBITRIL AND VALSARTAN SCH TAB: 49; 51 TABLET, FILM COATED ORAL at 09:04

## 2021-05-21 RX ADMIN — Medication PRN ML: at 20:15

## 2021-05-21 RX ADMIN — INSULIN LISPRO SCH UNIT: 100 INJECTION, SOLUTION INTRAVENOUS; SUBCUTANEOUS at 17:38

## 2021-05-21 RX ADMIN — INSULIN GLARGINE SCH UNIT: 100 INJECTION, SOLUTION SUBCUTANEOUS at 20:02

## 2021-05-21 RX ADMIN — INSULIN LISPRO SCH UNIT: 100 INJECTION, SOLUTION INTRAVENOUS; SUBCUTANEOUS at 09:11

## 2021-05-22 VITALS — SYSTOLIC BLOOD PRESSURE: 140 MMHG | DIASTOLIC BLOOD PRESSURE: 63 MMHG

## 2021-05-22 VITALS — TEMPERATURE: 98.2 F

## 2021-05-22 LAB
ANION GAP SERPL CALC-SCNC: 16 MMOL/L (ref 10–20)
BASOPHILS # BLD AUTO: 0 THOU/UL (ref 0–0.2)
BASOPHILS NFR BLD AUTO: 0.2 % (ref 0–1)
BUN SERPL-MCNC: 11 MG/DL (ref 8.4–25.7)
CALCIUM SERPL-MCNC: 8.2 MG/DL (ref 7.8–10.44)
CHLORIDE SERPL-SCNC: 97 MMOL/L (ref 98–107)
CO2 SERPL-SCNC: 29 MMOL/L (ref 23–31)
CREAT CL PREDICTED SERPL C-G-VRATE: 157 ML/MIN (ref 70–130)
EOSINOPHIL # BLD AUTO: 0.2 THOU/UL (ref 0–0.7)
EOSINOPHIL NFR BLD AUTO: 2.5 % (ref 0–10)
GLUCOSE SERPL-MCNC: 136 MG/DL (ref 80–115)
HGB BLD-MCNC: 11.6 G/DL (ref 14–18)
LYMPHOCYTES # BLD: 2.4 THOU/UL (ref 1.2–3.4)
LYMPHOCYTES NFR BLD AUTO: 28.2 % (ref 21–51)
MCH RBC QN AUTO: 27.8 PG (ref 27–31)
MCV RBC AUTO: 84.6 FL (ref 78–98)
MONOCYTES # BLD AUTO: 0.8 THOU/UL (ref 0.11–0.59)
MONOCYTES NFR BLD AUTO: 9 % (ref 0–10)
NEUTROPHILS # BLD AUTO: 5 THOU/UL (ref 1.4–6.5)
NEUTROPHILS NFR BLD AUTO: 60.1 % (ref 42–75)
PLATELET # BLD AUTO: 297 THOU/UL (ref 130–400)
POTASSIUM SERPL-SCNC: 3.5 MMOL/L (ref 3.5–5.1)
RBC # BLD AUTO: 4.15 MILL/UL (ref 4.7–6.1)
SODIUM SERPL-SCNC: 138 MMOL/L (ref 136–145)
WBC # BLD AUTO: 8.3 THOU/UL (ref 4.8–10.8)

## 2021-05-22 RX ADMIN — SACUBITRIL AND VALSARTAN SCH TAB: 49; 51 TABLET, FILM COATED ORAL at 08:37

## 2021-05-22 RX ADMIN — INSULIN LISPRO SCH UNIT: 100 INJECTION, SOLUTION INTRAVENOUS; SUBCUTANEOUS at 08:33

## 2021-05-28 ENCOUNTER — HOSPITAL ENCOUNTER (OUTPATIENT)
Dept: HOSPITAL 92 - BICRAD | Age: 61
Discharge: HOME | End: 2021-05-28
Payer: MEDICARE

## 2021-05-28 DIAGNOSIS — J90: ICD-10-CM

## 2021-05-28 DIAGNOSIS — I50.9: Primary | ICD-10-CM

## 2021-05-28 DIAGNOSIS — R91.8: ICD-10-CM

## 2021-05-28 PROCEDURE — 71046 X-RAY EXAM CHEST 2 VIEWS: CPT

## 2022-08-30 ENCOUNTER — HOSPITAL ENCOUNTER (OUTPATIENT)
Dept: HOSPITAL 92 - CSHSDC | Age: 62
Discharge: HOME | End: 2022-08-30
Attending: SPECIALIST
Payer: MEDICARE

## 2022-08-30 VITALS — SYSTOLIC BLOOD PRESSURE: 151 MMHG | DIASTOLIC BLOOD PRESSURE: 70 MMHG | TEMPERATURE: 98 F

## 2022-08-30 DIAGNOSIS — Z88.0: ICD-10-CM

## 2022-08-30 DIAGNOSIS — I25.2: ICD-10-CM

## 2022-08-30 DIAGNOSIS — E11.42: ICD-10-CM

## 2022-08-30 DIAGNOSIS — I48.0: ICD-10-CM

## 2022-08-30 DIAGNOSIS — I50.42: ICD-10-CM
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
## 2022-08-30 DIAGNOSIS — E78.2: ICD-10-CM

## 2022-08-30 DIAGNOSIS — T82.857A: ICD-10-CM

## 2022-08-30 DIAGNOSIS — E11.51: ICD-10-CM

## 2022-08-30 DIAGNOSIS — Z88.8: ICD-10-CM

## 2022-08-30 DIAGNOSIS — Z87.891: ICD-10-CM

## 2022-08-30 DIAGNOSIS — Z98.890: ICD-10-CM

## 2022-08-30 DIAGNOSIS — I11.0: ICD-10-CM

## 2022-08-30 DIAGNOSIS — I25.118: Primary | ICD-10-CM

## 2022-08-30 DIAGNOSIS — Z95.1: ICD-10-CM

## 2022-08-30 PROCEDURE — C1769 GUIDE WIRE: HCPCS

## 2022-08-30 PROCEDURE — 99152 MOD SED SAME PHYS/QHP 5/>YRS: CPT

## 2022-08-30 PROCEDURE — C1760 CLOSURE DEV, VASC: HCPCS

## 2022-08-30 PROCEDURE — 93459 L HRT ART/GRFT ANGIO: CPT
